# Patient Record
Sex: FEMALE | Race: WHITE | NOT HISPANIC OR LATINO | URBAN - METROPOLITAN AREA
[De-identification: names, ages, dates, MRNs, and addresses within clinical notes are randomized per-mention and may not be internally consistent; named-entity substitution may affect disease eponyms.]

---

## 2017-01-16 ENCOUNTER — OFFICE VISIT - RIVER FALLS (OUTPATIENT)
Dept: FAMILY MEDICINE | Facility: CLINIC | Age: 42
End: 2017-01-16

## 2017-01-25 ENCOUNTER — OFFICE VISIT - RIVER FALLS (OUTPATIENT)
Dept: FAMILY MEDICINE | Facility: CLINIC | Age: 42
End: 2017-01-25

## 2017-01-25 ASSESSMENT — MIFFLIN-ST. JEOR: SCORE: 1925.69

## 2017-04-21 ENCOUNTER — OFFICE VISIT - RIVER FALLS (OUTPATIENT)
Dept: FAMILY MEDICINE | Facility: CLINIC | Age: 42
End: 2017-04-21

## 2017-12-08 ENCOUNTER — AMBULATORY - RIVER FALLS (OUTPATIENT)
Dept: FAMILY MEDICINE | Facility: CLINIC | Age: 42
End: 2017-12-08

## 2017-12-09 LAB — HBA1C MFR BLD: 6.1 %

## 2017-12-13 ENCOUNTER — OFFICE VISIT - RIVER FALLS (OUTPATIENT)
Dept: FAMILY MEDICINE | Facility: CLINIC | Age: 42
End: 2017-12-13

## 2017-12-13 ASSESSMENT — MIFFLIN-ST. JEOR: SCORE: 1922.06

## 2018-04-03 ENCOUNTER — OFFICE VISIT - RIVER FALLS (OUTPATIENT)
Dept: FAMILY MEDICINE | Facility: CLINIC | Age: 43
End: 2018-04-03

## 2018-04-03 ASSESSMENT — MIFFLIN-ST. JEOR: SCORE: 1950.19

## 2018-04-04 LAB
CREAT SERPL-MCNC: 0.61 MG/DL (ref 0.5–1.1)
GLUCOSE BLD-MCNC: 69 MG/DL (ref 65–99)
HBA1C MFR BLD: 6.3 %

## 2018-08-03 ENCOUNTER — OFFICE VISIT - RIVER FALLS (OUTPATIENT)
Dept: FAMILY MEDICINE | Facility: CLINIC | Age: 43
End: 2018-08-03

## 2018-10-18 ENCOUNTER — OFFICE VISIT - RIVER FALLS (OUTPATIENT)
Dept: FAMILY MEDICINE | Facility: CLINIC | Age: 43
End: 2018-10-18

## 2018-10-19 LAB
CHOLEST SERPL-MCNC: 172 MG/DL
CHOLEST/HDLC SERPL: 4.2 {RATIO}
CREAT SERPL-MCNC: 0.67 MG/DL (ref 0.5–1.1)
GLUCOSE BLD-MCNC: 114 MG/DL (ref 65–99)
HBA1C MFR BLD: 6.2 %
HDLC SERPL-MCNC: 41 MG/DL
LDLC SERPL CALC-MCNC: 112 MG/DL
NONHDLC SERPL-MCNC: 131 MG/DL
TRIGL SERPL-MCNC: 91 MG/DL

## 2018-12-31 ENCOUNTER — OFFICE VISIT - RIVER FALLS (OUTPATIENT)
Dept: FAMILY MEDICINE | Facility: CLINIC | Age: 43
End: 2018-12-31

## 2019-05-28 ENCOUNTER — COMMUNICATION - RIVER FALLS (OUTPATIENT)
Dept: FAMILY MEDICINE | Facility: CLINIC | Age: 44
End: 2019-05-28

## 2019-05-29 ENCOUNTER — AMBULATORY - RIVER FALLS (OUTPATIENT)
Dept: FAMILY MEDICINE | Facility: CLINIC | Age: 44
End: 2019-05-29

## 2019-05-31 LAB
HBA1C MFR BLD: 7.1 %
TSH SERPL DL<=0.005 MIU/L-ACNC: 8.74 MIU/L

## 2019-06-03 ENCOUNTER — OFFICE VISIT - RIVER FALLS (OUTPATIENT)
Dept: FAMILY MEDICINE | Facility: CLINIC | Age: 44
End: 2019-06-03

## 2019-06-03 ENCOUNTER — COMMUNICATION - RIVER FALLS (OUTPATIENT)
Dept: FAMILY MEDICINE | Facility: CLINIC | Age: 44
End: 2019-06-03

## 2019-06-03 ASSESSMENT — MIFFLIN-ST. JEOR: SCORE: 1991.92

## 2019-06-24 ENCOUNTER — COMMUNICATION - RIVER FALLS (OUTPATIENT)
Dept: FAMILY MEDICINE | Facility: CLINIC | Age: 44
End: 2019-06-24

## 2019-07-05 ENCOUNTER — OFFICE VISIT - RIVER FALLS (OUTPATIENT)
Dept: FAMILY MEDICINE | Facility: CLINIC | Age: 44
End: 2019-07-05

## 2019-12-13 ENCOUNTER — AMBULATORY - RIVER FALLS (OUTPATIENT)
Dept: FAMILY MEDICINE | Facility: CLINIC | Age: 44
End: 2019-12-13

## 2019-12-14 LAB
HBA1C MFR BLD: 7.1 %
T4 FREE SERPL-MCNC: 1.4 NG/DL (ref 0.8–1.8)
TSH SERPL DL<=0.005 MIU/L-ACNC: 3.46 MIU/L

## 2019-12-16 ENCOUNTER — COMMUNICATION - RIVER FALLS (OUTPATIENT)
Dept: FAMILY MEDICINE | Facility: CLINIC | Age: 44
End: 2019-12-16

## 2020-01-06 ENCOUNTER — OFFICE VISIT - RIVER FALLS (OUTPATIENT)
Dept: FAMILY MEDICINE | Facility: CLINIC | Age: 45
End: 2020-01-06

## 2020-01-06 ASSESSMENT — MIFFLIN-ST. JEOR: SCORE: 1997.36

## 2020-10-01 ENCOUNTER — AMBULATORY - RIVER FALLS (OUTPATIENT)
Dept: FAMILY MEDICINE | Facility: CLINIC | Age: 45
End: 2020-10-01

## 2020-10-02 LAB
BASOPHILS # BLD MANUAL: 18 10*3/UL (ref 0–200)
BASOPHILS NFR BLD MANUAL: 0.2 %
BUN SERPL-MCNC: 12 MG/DL (ref 7–25)
BUN/CREAT RATIO - HISTORICAL: ABNORMAL (ref 6–22)
CALCIUM SERPL-MCNC: 9.5 MG/DL (ref 8.6–10.2)
CHLORIDE BLD-SCNC: 103 MMOL/L (ref 98–110)
CHOLEST SERPL-MCNC: 188 MG/DL
CHOLEST/HDLC SERPL: 3.8 {RATIO}
CO2 SERPL-SCNC: 28 MMOL/L (ref 20–32)
CREAT SERPL-MCNC: 0.55 MG/DL (ref 0.5–1.1)
EGFRCR SERPLBLD CKD-EPI 2021: 115 ML/MIN/1.73M2
EOSINOPHIL # BLD MANUAL: 359 10*3/UL (ref 15–500)
EOSINOPHIL NFR BLD MANUAL: 3.9 %
ERYTHROCYTE [DISTWIDTH] IN BLOOD BY AUTOMATED COUNT: 12.2 % (ref 11–15)
GLUCOSE BLD-MCNC: 170 MG/DL (ref 65–99)
HBA1C MFR BLD: 8 %
HCT VFR BLD AUTO: 45.1 % (ref 35–45)
HDLC SERPL-MCNC: 49 MG/DL
HGB BLD-MCNC: 15.3 GM/DL (ref 11.7–15.5)
LDLC SERPL CALC-MCNC: 119 MG/DL
LYMPHOCYTES # BLD MANUAL: 2355 10*3/UL (ref 850–3900)
LYMPHOCYTES NFR BLD MANUAL: 25.6 %
MCH RBC QN AUTO: 30 PG (ref 27–33)
MCHC RBC AUTO-ENTMCNC: 33.9 GM/DL (ref 32–36)
MCV RBC AUTO: 88.4 FL (ref 80–100)
MICROALBUMIN UR-MCNC: 1.6 MG/DL
MONOCYTES # BLD MANUAL: 506 10*3/UL (ref 200–950)
MONOCYTES NFR BLD MANUAL: 5.5 %
NEUTROPHILS # BLD MANUAL: 5962 10*3/UL (ref 1500–7800)
NEUTROPHILS NFR BLD MANUAL: 64.8 %
NONHDLC SERPL-MCNC: 139 MG/DL
PLATELET # BLD AUTO: 227 10*3/UL (ref 140–400)
PMV BLD: 11 FL (ref 7.5–12.5)
POTASSIUM BLD-SCNC: 4.3 MMOL/L (ref 3.5–5.3)
RBC # BLD AUTO: 5.1 10*6/UL (ref 3.8–5.1)
SODIUM SERPL-SCNC: 137 MMOL/L (ref 135–146)
TRIGL SERPL-MCNC: 92 MG/DL
TSH SERPL DL<=0.005 MIU/L-ACNC: 4.45 MIU/L
WBC # BLD AUTO: 9.2 10*3/UL (ref 3.8–10.8)

## 2020-10-04 ENCOUNTER — COMMUNICATION - RIVER FALLS (OUTPATIENT)
Dept: FAMILY MEDICINE | Facility: CLINIC | Age: 45
End: 2020-10-04

## 2020-10-12 ENCOUNTER — OFFICE VISIT - RIVER FALLS (OUTPATIENT)
Dept: FAMILY MEDICINE | Facility: CLINIC | Age: 45
End: 2020-10-12

## 2020-10-12 ASSESSMENT — MIFFLIN-ST. JEOR: SCORE: 1981.03

## 2021-01-04 ENCOUNTER — COMMUNICATION - RIVER FALLS (OUTPATIENT)
Dept: FAMILY MEDICINE | Facility: CLINIC | Age: 46
End: 2021-01-04

## 2021-01-04 ENCOUNTER — AMBULATORY - RIVER FALLS (OUTPATIENT)
Dept: FAMILY MEDICINE | Facility: CLINIC | Age: 46
End: 2021-01-04

## 2021-01-05 ENCOUNTER — COMMUNICATION - RIVER FALLS (OUTPATIENT)
Dept: FAMILY MEDICINE | Facility: CLINIC | Age: 46
End: 2021-01-05

## 2021-01-05 LAB — HBA1C MFR BLD: 7.4 %

## 2021-02-08 ENCOUNTER — OFFICE VISIT - RIVER FALLS (OUTPATIENT)
Dept: FAMILY MEDICINE | Facility: CLINIC | Age: 46
End: 2021-02-08

## 2021-02-08 ASSESSMENT — MIFFLIN-ST. JEOR: SCORE: 1969.24

## 2021-11-12 ENCOUNTER — AMBULATORY - RIVER FALLS (OUTPATIENT)
Dept: FAMILY MEDICINE | Facility: CLINIC | Age: 46
End: 2021-11-12

## 2021-11-13 LAB
BUN SERPL-MCNC: 14 MG/DL (ref 7–25)
BUN/CREAT RATIO - HISTORICAL: ABNORMAL (ref 6–22)
CALCIUM SERPL-MCNC: 9.5 MG/DL (ref 8.6–10.2)
CHLORIDE BLD-SCNC: 100 MMOL/L (ref 98–110)
CO2 SERPL-SCNC: 24 MMOL/L (ref 20–32)
CREAT SERPL-MCNC: 0.82 MG/DL (ref 0.5–1.1)
CREAT UR-MCNC: NORMAL MG/DL
EGFRCR SERPLBLD CKD-EPI 2021: 86 ML/MIN/1.73M2
GLUCOSE BLD-MCNC: 266 MG/DL (ref 65–99)
HBA1C MFR BLD: 11 %
MICROALBUMIN UR-MCNC: NORMAL MG/DL
POTASSIUM BLD-SCNC: 4.1 MMOL/L (ref 3.5–5.3)
SODIUM SERPL-SCNC: 135 MMOL/L (ref 135–146)
TSH SERPL DL<=0.005 MIU/L-ACNC: 8.37 MIU/L

## 2021-11-15 ENCOUNTER — COMMUNICATION - RIVER FALLS (OUTPATIENT)
Dept: FAMILY MEDICINE | Facility: CLINIC | Age: 46
End: 2021-11-15

## 2021-11-16 ENCOUNTER — COMMUNICATION - RIVER FALLS (OUTPATIENT)
Dept: FAMILY MEDICINE | Facility: CLINIC | Age: 46
End: 2021-11-16

## 2021-11-23 ENCOUNTER — OFFICE VISIT - RIVER FALLS (OUTPATIENT)
Dept: FAMILY MEDICINE | Facility: CLINIC | Age: 46
End: 2021-11-23

## 2021-11-24 ENCOUNTER — COMMUNICATION - RIVER FALLS (OUTPATIENT)
Dept: FAMILY MEDICINE | Facility: CLINIC | Age: 46
End: 2021-11-24

## 2021-11-29 ENCOUNTER — COMMUNICATION - RIVER FALLS (OUTPATIENT)
Dept: FAMILY MEDICINE | Facility: CLINIC | Age: 46
End: 2021-11-29

## 2022-01-31 ENCOUNTER — OFFICE VISIT - RIVER FALLS (OUTPATIENT)
Dept: FAMILY MEDICINE | Facility: CLINIC | Age: 47
End: 2022-01-31

## 2022-02-02 LAB
BUN SERPL-MCNC: 14 MG/DL (ref 7–25)
BUN/CREAT RATIO - HISTORICAL: ABNORMAL (ref 6–22)
CALCIUM SERPL-MCNC: 9.7 MG/DL (ref 8.6–10.2)
CHLORIDE BLD-SCNC: 103 MMOL/L (ref 98–110)
CO2 SERPL-SCNC: 23 MMOL/L (ref 20–32)
CREAT SERPL-MCNC: 0.72 MG/DL (ref 0.5–1.1)
EGFRCR SERPLBLD CKD-EPI 2021: 101 ML/MIN/1.73M2
GLUCOSE BLD-MCNC: 178 MG/DL (ref 65–99)
HBA1C MFR BLD: 10.2 %
POTASSIUM BLD-SCNC: 4 MMOL/L (ref 3.5–5.3)
SODIUM SERPL-SCNC: 138 MMOL/L (ref 135–146)
TSH SERPL DL<=0.005 MIU/L-ACNC: 9.83 MIU/L

## 2022-02-11 VITALS
DIASTOLIC BLOOD PRESSURE: 72 MMHG | SYSTOLIC BLOOD PRESSURE: 124 MMHG | WEIGHT: 271.8 LBS | HEIGHT: 70 IN | BODY MASS INDEX: 38.91 KG/M2 | HEART RATE: 74 BPM

## 2022-02-11 VITALS
SYSTOLIC BLOOD PRESSURE: 126 MMHG | HEIGHT: 70 IN | WEIGHT: 282.2 LBS | HEART RATE: 68 BPM | DIASTOLIC BLOOD PRESSURE: 68 MMHG | BODY MASS INDEX: 40.4 KG/M2

## 2022-02-11 VITALS
WEIGHT: 265.6 LBS | BODY MASS INDEX: 38.02 KG/M2 | SYSTOLIC BLOOD PRESSURE: 126 MMHG | DIASTOLIC BLOOD PRESSURE: 74 MMHG | HEART RATE: 64 BPM | HEIGHT: 70 IN

## 2022-02-11 VITALS
BODY MASS INDEX: 39.88 KG/M2 | WEIGHT: 278.6 LBS | HEIGHT: 70 IN | DIASTOLIC BLOOD PRESSURE: 72 MMHG | SYSTOLIC BLOOD PRESSURE: 138 MMHG | HEART RATE: 72 BPM

## 2022-02-11 VITALS
HEART RATE: 91 BPM | DIASTOLIC BLOOD PRESSURE: 82 MMHG | TEMPERATURE: 98.1 F | OXYGEN SATURATION: 96 % | HEIGHT: 70 IN | SYSTOLIC BLOOD PRESSURE: 134 MMHG

## 2022-02-11 VITALS
HEART RATE: 62 BPM | SYSTOLIC BLOOD PRESSURE: 136 MMHG | HEIGHT: 70 IN | BODY MASS INDEX: 39.51 KG/M2 | DIASTOLIC BLOOD PRESSURE: 68 MMHG | WEIGHT: 276 LBS

## 2022-02-11 VITALS
HEART RATE: 95 BPM | BODY MASS INDEX: 38.14 KG/M2 | DIASTOLIC BLOOD PRESSURE: 80 MMHG | TEMPERATURE: 98.8 F | WEIGHT: 266.4 LBS | HEIGHT: 70 IN | OXYGEN SATURATION: 93 % | SYSTOLIC BLOOD PRESSURE: 130 MMHG

## 2022-02-11 VITALS
WEIGHT: 281.6 LBS | DIASTOLIC BLOOD PRESSURE: 78 MMHG | SYSTOLIC BLOOD PRESSURE: 122 MMHG | WEIGHT: 281 LBS | HEART RATE: 64 BPM | DIASTOLIC BLOOD PRESSURE: 70 MMHG | BODY MASS INDEX: 40.23 KG/M2 | BODY MASS INDEX: 40.99 KG/M2 | HEIGHT: 70 IN | TEMPERATURE: 98.7 F | HEART RATE: 64 BPM | SYSTOLIC BLOOD PRESSURE: 142 MMHG

## 2022-02-11 VITALS
OXYGEN SATURATION: 97 % | SYSTOLIC BLOOD PRESSURE: 118 MMHG | HEART RATE: 89 BPM | HEIGHT: 70 IN | TEMPERATURE: 98.1 F | BODY MASS INDEX: 39.56 KG/M2 | DIASTOLIC BLOOD PRESSURE: 70 MMHG

## 2022-02-11 VITALS
WEIGHT: 292 LBS | DIASTOLIC BLOOD PRESSURE: 84 MMHG | OXYGEN SATURATION: 95 % | SYSTOLIC BLOOD PRESSURE: 138 MMHG | BODY MASS INDEX: 42.5 KG/M2 | TEMPERATURE: 98.3 F

## 2022-02-12 VITALS
HEART RATE: 85 BPM | SYSTOLIC BLOOD PRESSURE: 137 MMHG | WEIGHT: 273.6 LBS | DIASTOLIC BLOOD PRESSURE: 84 MMHG | TEMPERATURE: 98.2 F | OXYGEN SATURATION: 98 % | BODY MASS INDEX: 39.82 KG/M2

## 2022-02-16 NOTE — CARE COORDINATION
Pt appears on  [facility] chronic disease panel as out of parameters for DM and not being on a statin.     Due for exam now. RTC placed for her.

## 2022-02-16 NOTE — TELEPHONE ENCOUNTER
---------------------  From: GABRIELE FINK  To: [facility]  Sent: 01/04/2021 07:50 a.m. CST  Subject: Blood sugar  Dr. Ferguson    I have about 1 week of medformin left.  Blood sugar running 115 to 140.  Do I need to do a1c check or do you want to refill current prescription?    Gabriele Fink---------------------  From: Felecia Hubbard CMA (Phone Messages Pool ([id number]_[employer]))   To: ZIM Message Pool ([id number]_[place]);     Sent: 1/4/2021 9:50:13 AM CST  Subject: FW: Blood sugar---------------------  From: Bella Irby CMA (Ideapod Message Pool ([id number]_[employer]))   To: GABRIELE FINK    Sent: 1/4/2021 10:43:11 AM CST  Subject: RE: Blood sugar     William Hammond,  I sent in a refill of your metformin. You are do to check your A1C this month, I will have our scheduling dept. reach out to you to set up a time or the lab visit.  Have a nice dayJoselyn---------------------  From: Bella Irby CMA ([employer] Message Pool ([id number]_[employer]))   To: Appointment Pool ([id number]_[place]);     Sent: 1/4/2021 10:43:39 AM CST  Subject: FW: Blood sugar     Can you contact pt to set up non- fasting lab appt.Scheduled

## 2022-02-16 NOTE — TELEPHONE ENCOUNTER
---------------------  From: GABRIELE FINK  To: [facility]  Sent: 11/15/2021 08:47 p.m. CST  Subject: Follow up  I am scheduled for next Tuesday.

## 2022-02-16 NOTE — PROGRESS NOTES
Patient:   GABRIELE FINK            MRN: [id number]            FIN: [id number]               Age:   43 years     Sex:  Female     :  1975   Associated Diagnoses:   Sinus infection   Author:   Dameon Ferguson MD      Chief Complaint   2018 4:38 PM CST   Possible sinus infection-has had bilateral ear pain, fever, cough, facial pain and pressure, has had symptoms about one week      History of Present Illness   see chief complaint as noted above and confirmed with the patient     43 year old female presents with complaint of not feeling well. For the past week she has had bilateral ear pain, headache, coughing up yellow phlegm, facial pain and pressure, nasal congestion, and fevers. She works from 3am until 7pm and she works very hard, she took a week off and than she began to get sick. She says she feels rundown and tired, her ear pain felt as if she was getting stabbed in her ears. She works in a field and takes Allegra daily to help with allergies. She does feel today she has been able to breath through her nose today and that has not been happening for the past week.       Review of Systems   Constitutional:  Fever.    Ear/Nose/Mouth/Throat:  Nasal congestion, No sore throat.         Ear pain: Bilateral.    Respiratory:  Cough, No shortness of breath, No wheezing.    Cardiovascular:  No chest pain.    Gastrointestinal:  No nausea, No vomiting, No diarrhea.    Integumentary:  No rash.    Neurologic:  Alert and oriented X4, Headache.       Health Status   Allergies:    Allergic Reactions (Selected)  Severity Not Documented  Sulfa drugs (Hives, sob)   Medications:  (Selected)   Prescriptions  Prescribed  zonia contour lancets: zonia contour lancets, See Instructions, Instructions: testing 2x/ day, Supply, # 1 box(es), 3 Refill(s), Type: Maintenance, Pharmacy: [facility], testing 2x/ day  contour test strips: contour test strips, See Instructions, Instructions: test 2x/ day, Supply, # 200 EA, 3  "Refill(s), Type: Maintenance, Pharmacy: [facility], test 2x/ day  levothyroxine 100 mcg (0.1 mg) oral tablet: = 1 tab(s) ( 100 mcg ), po, daily, # 90 tab(s), 1 Refill(s), Type: Maintenance, Pharmacy: [facility], 1 tab(s) Oral daily  Documented Medications  Documented  Aleve 220 mg oral tablet: 2 tab(s) ( 440 mg ), PO, q8hr, PRN: for headache, # 60 tab(s), 0 Refill(s), Type: Maintenance  Allegra 24 Hour Allergy: ( 180 mg ), Oral, daily, 0 Refill(s), Type: Maintenance  Fish Oil: po, 0 Refill(s), Type: Maintenance  Iron Chews: ( 15 mg ), po, daily, 0 Refill(s), Type: Maintenance  Vitamin D with Minerals oral tablet: 1 tab(s), po, daily, 0 Refill(s), Type: Maintenance  multivitamin with fluoride: daily, 0 Refill(s), Type: Maintenance   Problem list:    All Problems  Type 2 diabetes mellitus / SNOMED CT 802080801 / Confirmed  Refusal of statin medication by patient / SNOMED CT 586523877 / Confirmed  Obese / ICD-9-.00 / Probable  Morbid Obesity / ICD-9-.01 / Probable  Hypothyroid / ICD-9-.9 / Confirmed  Female Hirsutism / ICD-9-.1 / Confirmed  Anemia, iron deficiency / ICD-9-.9 / Confirmed  Inactive: Tobacco user / ICD-9-.1  Inactive: Menarche / ICD-9-CM V21.8  Resolved: Concussion / ICD-9-.9      Histories   Past Medical History:    Active  Type 2 diabetes mellitus (625949158): Onset in the month of 9/2016 at 40 years  Resolved  Concussion (850.9): Onset on 9/20/2004 at 28 years.  Resolved.  Comments:  5/10/2011 CDT 2:02 PM CDT - Judy Sanz  significant fall with mild head concussion and fracture of L4   Family History:    Lymphoma  Brother  Hypertension  Mother  Diabetes mellitus type I  Father  Sister  Thyroid Ca  Mother  Brother  Cancer  Sister  Comments:  12/21/2011 4:40 PM EDDA - Elena CMA, April  \"muscle cancer\"  Obesity, morbid.  Sister     Procedure history:    Laparoscopic cholecystectomy (65208002) on 9/19/2013 at 37 Years.  Comments:  9/23/2013 10:41 " DEBORAH BARTONT - Christa BALDWIN, Alisha  Robotic assisted  History of shoulder surgery ([id number]) in 2006 at 31 Years.   Social History:        Alcohol Assessment: Current            Current, Wine (5 oz), 1-2 times per week      Tobacco Assessment: Past            Current                     Comments:                      05/10/2011 - Yvon Klarissaan                     quit      Substance Abuse Assessment            Never      Employment and Education Assessment            Employed, Work/School description: [employer].      Home and Environment Assessment            Marital status: .  Spouse/Partner name: Harsha.      Exercise and Physical Activity Assessment: Regular exercise            Exercise type: Walking.            Exercise frequency: 3-4 times/week.  Exercise type: Walking.      Sexual Assessment            Sexually active: Yes.  Sexual orientation: Heterosexual.      Physical Examination   Vital Signs   12/31/2018 4:38 PM CST Temperature Tympanic 98.1 DegF    Peripheral Pulse Rate 91 bpm    Pulse Site Radial artery    Systolic Blood Pressure 134 mmHg  HI    Diastolic Blood Pressure 82 mmHg  HI    Mean Arterial Pressure 99 mmHg    BP Site Right arm    Oxygen Saturation 96 %      Measurements from flowsheet : Measurements   12/31/2018 4:38 PM CST   Height Measured - Standard                69.5 in     General:  Alert and oriented, No acute distress.    Eye:  Pupils are equal, round and reactive to light, Normal conjunctiva.    HENT:  Oral mucosa is moist.    Neck:  Supple.    Respiratory:  Lungs are clear to auscultation, Respirations are non-labored.    Cardiovascular:  Normal rate, Regular rhythm, No edema.    Gastrointestinal:  Non-distended.    Musculoskeletal:  Normal gait.    Integumentary:  Warm, No rash.    Neurologic:  Alert, Oriented.    Psychiatric:  Cooperative, Appropriate mood & affect, Normal judgment.       Review / Management   Results review      Impression and Plan        Diagnosis     Sinus infection (NZC52-HL J32.9).     Plan:  Will treat with Amoxicillian for 10 days.       Sinusitis responds well to moist air and irrigation -- consider trying saltwater nasal sprays or a NETI pot twice daily.  Showers and hot soups can help also.  Afrin nasal spray (over-the-counter medication) can provide symptom relief as it clears the nose.  After 3 days of use it must be discontinued for at least 2 weeks or complications can happen.  Sudafed can relieve pressure but might make you feel jittery or your heart race.  You should begin to improve after several days but it may take a couple of weeks to completely resolve your symptoms.  Return if you are not improving or if your symptoms become worse. .    Summary:  Reviewed expected cause with patient    What to watch for     and when to return  .    Soni DEVI Medical Assistant acted solely as a scribe for, and in presence of Dr. Dameon Ferguson who performed the services.

## 2022-02-16 NOTE — PROGRESS NOTES
Chief Complaint    Med check  History of Present Illness       Patient here to discuss her medications her problems include       1.  Morbid obesity BMI 40       2 diabetes mellitus       She has changed her eating and it has resulted in her A1c improving from 8-7.4.  She mostly is avoiding late night snacking and decreased her carb intake.  She has been very active and does monitor her steps.  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       Hypothyroidism patient's TSH in October 2020 was 4.45  Review of Systems       See HPI.  All other review of systems negative.  Physical Exam   Vitals & Measurements    HR: 62 (Peripheral)  BP: 136/68     HT: 69.5 in  WT: 276 lb  BMI: 40.17        Alert and oriented White sclera       Supple neck no thyromegaly or adenopathy       Lungs are clear to auscultation       Heart regular no murmur       Feet have no callus good monofilament testing  Assessment/Plan       1. Hypothyroid (E03.9)          Continue on her present dose of levothyroxine 125 mcg                2. Morbid Obesity (E66.01)          She is started healthier eating she is shown in the past that she can stick to it and hopefully it will result in some weight loss                3. Refusal of statin medication by patient (Z53.29)          Patient's most recent LDL was 119 and her HDL was 49 we discussed statin use in diabetics she does not want to add it at this time                4. Type 2 diabetes mellitus (E11.9)          Patient's A1c is improved but I talked about how there is still improvement on the table.  Her 10-year cardiac risk is estimated at 2.1% so she does not meet criteria for aspirin use she has not had any hypoglycemia she will continue on her Metformin and will recheck in 6 months         Ordered:          Return to Clinic (Request), RFV: DM check, Return in 6 months                Orders:         metFORMIN, = 1 tab(s) (  500 mg ), Oral, bid, # 60 tab(s), 0 Refill(s), Type: Hard Stop, Pharmacy: [facility], 69.5, in, 10/12/20 17:18:00 CDT, Height Measured, 278.6, lb, 10/12/20 17:18:00 CDT, Weight Measured, (Completed)         metFORMIN, = 1 tab(s) ( 500 mg ), Oral, bid, # 180 tab(s), 3 Refill(s), Type: Maintenance, Pharmacy: [facility], 1 tab(s) Oral bid,x90 day(s), 69.5, in, 02/08/21 17:17:00 CST, Height Measured, 276, lb, 02/08/21 17:17:00 CST, Weight Measured, (Ordered)         Return to Clinic (Request), RFV: DM check with a1c, Return in 3 months         Return to Clinic (Request), RFV: DM check and fasting labs, Return in 6 months  Patient Information     Name:GABRIELE FINK      Address:      [address] [place] [id number]     Sex:Female     YOB: 1975     [phone]     Emergency Contact:JERMAINE FINK     MRN:[id number]     FIN:[id number]     Location:[facility]     Date of Service:02/08/2021      Primary Care Physician:       Dameon Ferguson MD, [phone]      Attending Physician:       Dameon Ferguson MD, [phone]  Problem List/Past Medical History    Ongoing     Anemia, iron deficiency     Female Hirsutism     Hypothyroid     Menarche     Morbid Obesity     Obese     Refusal of statin medication by patient     Tobacco user       Comments: pt quit 2009     Type 2 diabetes mellitus    Historical     Concussion       Comments: significant fall with mild head concussion and fracture of L4  Procedure/Surgical History     Laparoscopic cholecystectomy (09/19/2013)      Comments: Robotic assisted.     History of shoulder surgery (2006)  Medications    Aleve 220 mg oral tablet, 440 mg= 2 tab(s), Oral, q8 hrs, PRN    zonia contour lancets, See Instructions, 3 refills    contour test strips, See Instructions, 3 refills    Fish Oil, Oral    Iron Chews, 15 mg, Oral, daily    levothyroxine 125 mcg (0.125 mg) oral tablet, 125 mcg= 1 tab(s), Oral, daily, 3  refills    metFORMIN 500 mg oral tablet, 500 mg= 1 tab(s), Oral, bid, 3 refills    multivitamin with fluoride, daily    Vitamin D with Minerals oral tablet, 1 tab(s), Oral, daily  Allergies    sulfa drugs (hives, SOB)  Social History    Smoking Status     Never smoker     Alcohol - Current      Current, Wine (5 oz), 1-2 times per week     Electronic Cigarette/Vaping      Electronic Cigarette Use: Never.     Employment/School      Employed, Work/School description: [employer].     Exercise - Regular exercise      Exercise frequency: 3-4 times/week. Exercise type: Walking.     Home/Environment      Marital status: . Spouse/Partner name: Harsha.     Sexual      Sexually active: Yes. Sexual orientation: Heterosexual.     Substance Abuse      Never     Tobacco - Past      Former smoker, quit more than 30 days ago  Family History    Cancer: Sister.    Diabetes mellitus type I: Father and Sister.    Hypertension: Mother.    Lymphoma: Brother.    Obesity, morbid.: Sister.    Thyroid Ca: Mother and Brother.    Sister: History is negative  Immunizations      Vaccine Date Status          tetanus/diphth/pertuss (Tdap) adult/adol 12/13/2017 Given          tetanus/diphth/pertuss (Tdap) adult/adol 11/21/2007 Recorded  Lab Results          Lab Results (Last 4 results within 90 days)           Hgb A1c: 7.4 High (01/04/21 13:28:00)

## 2022-02-16 NOTE — PROGRESS NOTES
Patient:   GABRIELE FINK            MRN: [id number]            FIN: [id number]               Age:   41 years     Sex:  Female     :  1975   Associated Diagnoses:   Hypothyroid; Anemia, iron deficiency; Morbid Obesity; Pain in left knee; Type 2 diabetes mellitus   Author:   Dameon Ferguson MD      Visit Information      Date of Service: 2017 05:55 pm  Performing Location: [facility] [place]  Encounter#: [id number]      Chief Complaint   2017 6:02 PM CST    pt here for diabetic check, has not been taking metfromin, also left knee pain has had to have chiro put it back in place 3 times this last month      History of Present Illness    The patient is a 41-year-old female here following up diabetes.    She has been putting significant effort into improving her lifestyle.  She has hired a  who monitors her weight, sugars, and diet electronically and giving her nutritional helps through [facility].  With that she says she feels much better, has lost weight, says her snoring has stopped, and she has good energy through the day.  Her most recent hemoglobin A1c was down to 6.8.     Her main concern has been her left knee.  She says it gives out on her.  It seems to click and then it will not work and she has actually fallen.  She says it feels like her knee dislocates.  She would like to be aggressive and see Orthopedics for this.  She is not having swelling, fever or chills.  No trauma.           Review of Systems   Constitutional:  No fever, No chills.    Eye   Ear/Nose/Mouth/Throat:  No nasal congestion, No sore throat.    Respiratory:  No shortness of breath, No cough.    Cardiovascular   Breast   Gastrointestinal:  No nausea, No vomiting, No diarrhea, No constipation.    Genitourinary:  No dysuria.    Gynecologic   Hematology/Lymphatics:  No bruising tendency, No swollen lymph glands.    Endocrine   Immunologic:  No recurrent fevers, No recurrent infections.   "  Musculoskeletal:  Joint pain (left knee), No muscle pain.    Integumentary:  No rash.    Neurologic:  No tingling, No headache.    Psychiatric            Health Status   Allergies:    Allergic Reactions (Selected)  Severity Not Documented  Sulfa drugs (Hives, sob)   Medications:  (Selected)   Prescriptions  Prescribed  zonia contour lancets: zonia contour lancets, See Instructions, Instructions: testing 2x/ day, Supply, # 1 box(es), 3 Refill(s), Type: Maintenance, Pharmacy: [facility], testing 2x/ day  contour test strips: contour test strips, See Instructions, Instructions: test 2x/ day, Supply, # 200 EA, 3 Refill(s), Type: Maintenance, Pharmacy: [facility], test 2x/ day  levothyroxine 100 mcg (0.1 mg) oral tablet: 1 tab(s) ( 100 mcg ), po, daily, # 90 tab(s), 1 Refill(s), Type: Maintenance, Pharmacy: [facility], 1 tab(s) po daily  Documented Medications  Documented  Iron Chews: ( 15 mg ), po, daily, 0 Refill(s), Type: Maintenance  Vitamin D with Minerals oral tablet: 1 tab(s), po, daily, 0 Refill(s), Type: Maintenance  multivitamin with fluoride: daily, 0 Refill(s), Type: Maintenance   Problem list:    All Problems (Selected)  Obese / 278.00 / Probable  Female Hirsutism / 704.1 / Confirmed  Anemia, iron deficiency / 280.9 / Confirmed  Hypothyroid / 244.9 / Confirmed  Morbid Obesity / 278.01 / Probable  Type 2 diabetes mellitus / 151913135 / Confirmed      Histories   Past Medical History:    Active  Type 2 diabetes mellitus (923981593): Onset in the month of 9/2016 at 40 years  Resolved  Concussion (850.9): Onset on 9/20/2004 at 28 years.  Resolved.  Comments:  5/10/2011 CDT 2:02 PM CDT - Judy Sanz  significant fall with mild head concussion and fracture of L4   Family History:    Lymphoma  Brother  Hypertension  Mother  Diabetes mellitus type I  Father  Sister  Thyroid Ca  Mother  Brother  Cancer  Sister  Comments:  12/21/2011 4:40 PM - Kassy EUCEDA, April  \"muscle cancer\"  Obesity, " morbid.  Sister     Procedure history:    Laparoscopic cholecystectomy (SNOMED CT 22430483) performed by Sagar Thomas MD on 9/19/2013 at 37 Years.  Comments:  9/23/2013 10:41 AM - Alisha Goodwin RN  Robotic assisted  History of shoulder surgery (SNOMED CT 1357771781) in 2006 at 31 Years.   Social History:        Alcohol Assessment: Current            Current, Wine (5 oz), 1-2 times per week      Tobacco Assessment: Past            Current                     Comments:                      05/10/2011 - Judy Sanz      Substance Abuse Assessment            Never      Employment and Education Assessment            Employed, Work/School description: [employer].      Home and Environment Assessment            Marital status: .  Spouse/Partner name: Harsha.      Exercise and Physical Activity Assessment: Regular exercise            Exercise type: Walking.            Exercise frequency: 3-4 times/week.  Exercise type: Walking.      Sexual Assessment            Sexually active: Yes.  Sexual orientation: Heterosexual.        Physical Examination   Vital Signs   1/25/2017 6:02 PM CST Temperature Tympanic 98.8 DegF    Peripheral Pulse Rate 95 bpm    Pulse Site Radial artery    Systolic Blood Pressure 132 mmHg    Diastolic Blood Pressure 84 mmHg    Mean Arterial Pressure 100 mmHg    BP Site Right arm    BP Systolic Repeat 130 mmHg    BP Diastolic Repeat 80 mmHg    BP Site Repeat Right arm    Oxygen Saturation 93 %  LOW      Measurements from flowsheet : Measurements   1/25/2017 6:02 PM CST Height Measured - Standard 69.5 in    Weight Measured - Standard 266.4 lb    BSA 2.43 m2    Body Mass Index 38.77 kg/m2      General:  Alert and oriented, No acute distress.    Eye:  Pupils are equal, round and reactive to light, Normal conjunctiva.    HENT:  Oral mucosa is moist.    Neck:  Supple.    Respiratory:  Lungs are clear to auscultation, Respirations are non-labored.     Cardiovascular:  Normal rate, Regular rhythm, No edema.    Gastrointestinal:  Non-distended.    Musculoskeletal:  Normal strength, No deformity, Normal gait.    Integumentary:  Warm, No rash.    Psychiatric:  Cooperative, Appropriate mood & affect, Normal judgment.       Review / Management   Results review:  Lab results   1/16/2017 7:55 AM CST Sodium Level 140 mmol/L    Potassium Level 4.4 mmol/L    Chloride Level 104 mmol/L    CO2 Level 27 mmol/L    Glucose Level 144 mg/dL  HI    BUN 16 mg/dL    Creatinine 0.73 mg/dL    BUN/Creat Ratio NOT APPLICABLE    eGFR 103 mL/min/1.73m2    eGFR African American 119 mL/min/1.73m2    Calcium Level 9.3 mg/dL    Hgb A1c 6.8  HI    U Microalbumin 0.9 mg/dL    Ur Creatinine 112 mg/dL    Ur Microalb/Creat Ratio 8   .       Impression and Plan   Diagnosis     Hypothyroid (HJL13-YA E03.9).     Anemia, iron deficiency (CJE32-MV D50.9).     Hypothyroid (PBF59-UU E03.9).     Morbid Obesity (DJZ52-ZV E66.01).     Pain in left knee (NOA05-MJ M25.562).     Type 2 diabetes mellitus (FQF76-CY E11.9).     Plan:  Because of the pain of the left knee and her desire to see Orthopedics I have put a referral in.     I cautioned her with her new found exercise to be especially careful on uneven ground or running and that biking may be easier on her knee.  We talked about the difference between short term changes versus long term health and she seems to have the right expectations and is making good strides.     We will be rechecking A1c along with TSH and LDL in three to four months.

## 2022-02-16 NOTE — TELEPHONE ENCOUNTER
---------------------  From: Lisa Hampton CMA   Sent: 12/3/2019 9:40:53 AM CST  Subject: Med Management     ** Submitted: **  Order:levothyroxine (levothyroxine 125 mcg (0.125 mg) oral tablet)  1 tab(s)  Oral  daily  Qty:  30 tab(s)        Duration:  30 day(s)        Refills:  0          Substitutions Allowed     Route To Pharmacy - [facility]    Signed by Lisa Hampton CMA  12/3/2019 9:40:00 AM    Phone Message    PCP:   Dania      Time of Call:  0839       Person Calling:  self  Phone number:  [phone]    Returned call at: 0938    Note:   pt calling about getting a refill of her levothyroxine to [facility]  LM on identified VM that she is due for labs and visit.  Told her I will refill x 1month and ask her to set up appt with for labs and Zim prior to next refill.  Asked to c/b with any questions.    Last office visit and reason:  6/3 hypothyroidism

## 2022-02-16 NOTE — PROGRESS NOTES
Chief Complaint    DM check  c/o knee pain and it has looked bruised since July  History of Present Illness      44-year-old here for refill of her thyroid medicine       Patient remains very physically active she has had a left knee replacement.  Her right knee does hurt her and is gotten worse over the last 6 months.  Her right knee has not locked or given way.       Patient had become better and get down to 250 pounds with regular exercise unfortunately she gained 30 pounds back is been stable at 280 now.  She has started exercising again and her knees are tolerating it.  Her blood sugars have been 100 to 1:30 in the morning and during the day it seems to see okay most recent A1c is over 7.1 consistent with average glucose of around 170  Review of Systems      No rashes, no chest pain, no shortness of breath, no headaches, no visual changes       No paresthesias, no bowel or bladder dysfunction, no falls  Physical Exam   Vitals & Measurements    HR: 68(Peripheral)  BP: 126/68     HT: 69.5 in  WT: 282.2 lb  BMI: 41.07       General: Alert and oriented, No acute distress.      Eye: Pupils are equal, round and reactive to light, Normal conjunctiva.      HENT: Oral mucosa is moist.      Neck: Supple.      Respiratory: Respirations are non-labored.  Clear to auscultation      Cardiovascular: Normal rate, Regular rhythm, No edema.      Gastrointestinal: Non-distended.      Musculoskeletal: Normal gait.      Integumentary: Warm, No rash.      Psychiatric: Cooperative, Appropriate mood & affect, Normal judgment  Assessment/Plan       1. Morbid Obesity (E66.01)         Patient goal is to lose 30 pounds over the next 6 months she has done it before through careful eating and changing some of her eating patterns she also works for 2 going to the gym and being more active        I reviewed x-ray of the knee which showed some mild DJD on the right knee she thinks she is going to see her orthopedic surgeon to see if they will  do cortisone and she is aware how weight loss would help her knee pain         Ordered:          30303 office outpatient visit 25 minutes (Charge), Quantity: 1, Right knee pain  Type 2 diabetes mellitus  Morbid Obesity  Hypothyroid                2. Type 2 diabetes mellitus (E11.9)         I recommended she start metformin she had a prescription once but never took it she declined saying she thinks she will lose weight go to the gym and she can have an A1c rechecked 6 months         Ordered:          49528 office outpatient visit 25 minutes (Charge), Quantity: 1, Right knee pain  Type 2 diabetes mellitus  Morbid Obesity  Hypothyroid                3. Hypothyroid (E03.9)         I refilled her medication she will be rechecked in 1 year         Ordered:          [id number] office outpatient visit 25 minutes (Charge), Quantity: 1, Right knee pain  Type 2 diabetes mellitus  Morbid Obesity  Hypothyroid                Orders:         levothyroxine, = 1 tab(s) ( 125 mcg ), Oral, daily, # 90 tab(s), 3 Refill(s), Type: Maintenance, Pharmacy: [facility], Due for labs and visit, 1 tab(s) Oral daily,x90 day(s), (Ordered)         levothyroxine, = 1 tab(s) ( 125 mcg ), Oral, daily, x 30 day(s), # 30 tab(s), 0 Refill(s), Type: Hard Stop, Pharmacy: [facility], Due for labs and visit, (Completed)         XR Knee Min 3 Views Right (Request), Right knee pain  Patient Information     Name:GABRIELE FINK      Address:      [address] [place] [id number]     Sex:Female     YOB: 1975     [phone]     Emergency Contact:JERMAINE FINK     MRN:[id number]     FIN:[id number]     Location:[facility]     Date of Service:01/06/2020      Primary Care Physician:       Dameon Ferguson MD, [phone]      Attending Physician:       Dameon Ferguson MD, [phone]  Problem List/Past Medical History    Ongoing     Anemia, iron deficiency     Female Hirsutism      Hypothyroid     Menarche     Morbid Obesity     Obese     Refusal of statin medication by patient     Tobacco user       Comments: pt quit 2009     Type 2 diabetes mellitus    Historical     Concussion       Comments: significant fall with mild head concussion and fracture of L4  Procedure/Surgical History     Laparoscopic cholecystectomy (09/19/2013)      Comments: Robotic assisted.     History of shoulder surgery (2006)  Medications    Aleve 220 mg oral tablet, 440 mg= 2 tab(s), Oral, q8 hrs, PRN    zonia contour lancets, See Instructions, 3 refills    contour test strips, See Instructions, 3 refills    Fish Oil, Oral    Iron Chews, 15 mg, Oral, daily    levothyroxine 125 mcg (0.125 mg) oral tablet, 125 mcg= 1 tab(s), Oral, daily, 3 refills    multivitamin with fluoride, daily    Vitamin D with Minerals oral tablet, 1 tab(s), Oral, daily  Allergies    sulfa drugs (hives, SOB)  Social History    Smoking Status - 01/06/2020     Former smoker     Alcohol - Current, 05/10/2011      Current, Wine (5 oz), 1-2 times per week, 05/10/2011     Employment/School      Employed, Work/School description: [employer] 12/21/2011     Exercise - Regular exercise, 05/10/2011      Exercise frequency: 3-4 times/week. Exercise type: Walking., 12/21/2011      Exercise type: Walking., 05/10/2011     Home/Environment      Marital status: . Spouse/Partner name: Harsha., 12/21/2011     Sexual      Sexually active: Yes. Sexual orientation: Heterosexual., 12/21/2011     Substance Abuse      Never, 12/21/2011     Tobacco - Past, 05/10/2011      Former smoker, quit more than 30 days ago, 07/05/2019      Current, 05/10/2011  Family History    Cancer: Sister.    Diabetes mellitus type I: Father and Sister.    Hypertension: Mother.    Lymphoma: Brother.    Obesity, morbid.: Sister.    Thyroid Ca: Mother and Brother.    Sister: History is negative  Immunizations      Vaccine Date Status          tetanus/diphth/pertuss  (Tdap) adult/adol 12/13/2017 Given          tetanus/diphth/pertuss (Tdap) adult/adol 11/21/2007 Recorded  Lab Results          Lab Results (Last 4 results within 90 days)           Hgb A1c: 7.1 High (12/13/19 08:27:00)          T4 Free: 1.4 ng/dL [0.8 ng/dL - 1.8 ng/dL] (12/13/19 08:27:00)          TSH: 3.46 mIU/L (12/13/19 08:27:00)

## 2022-02-16 NOTE — LETTER
(Inserted Image. Unable to display)   September 04, 2019        GABRIELE FINK  [address] [place], [place] [id number]        Dear GABRIELE,      Thank you for selecting [facility] (previously [facility]) for your healthcare needs.    Our records indicate you are due for the following services:     Non-Fasting Labs    If you had your labs done at another facility or with Direct Access Lab Testing at [facility], please bring in a copy of the results to your next visit, mail a copy, or drop off a copy of your results to your Healthcare Provider.    To schedule an appointment or if you have further questions, please contact your primary clinic:   [facility]       [phone]   [facility]       [phone]              [facility]     [phone]      Powered by [employer]    Sincerely,    Dameon Ferguson MD

## 2022-02-16 NOTE — LETTER
(Inserted Image. Unable to display)     May 06, 2019      GABRIELE FINK  [address] [place], [place] [id number]          Dear GABRIELE,      Thank you for selecting [facility] (previously [facility]) for your healthcare needs.    Our records indicate you are due for the following services:    Diabetic Exam ~ Please bring your glucose meter and/or your blood glucose diary to your appointment.    Non-Fasting Labs.    If you had your labs done at another facility or with Direct Access Lab Testing at [facility], please bring in a copy of the results to your next visit, mail a copy, or drop off a copy of your results to your Healthcare Provider.    You are due for lab work and an office visit; please schedule the lab appointment 1 week before the office visit.  This will assure all results are available to discuss with your provider during your visit.    **It is very helpful if you bring your medication bottles to your appointment.  This assures we have all of your current medications, including strength and dosing information, documented accurately in your medical record.    To schedule an appointment or if you have further questions, please contact your primary clinic:   [facility]       [phone]   [facility]       [phone]              [facility]     [phone]      Powered by [employer] and [employer]    Sincerely,    Dameon Ferguson MD

## 2022-02-16 NOTE — NURSING NOTE
Comprehensive Intake Entered On:  6/3/2019 6:18 PM CDT    Performed On:  6/3/2019 6:15 PM CDT by Bella Irby CMA               Summary   Chief Complaint :   f/u Labs    Advance Directive :   No   Menstrual Status :   Menarcheal   Weight Measured :   281.0 lb(Converted to: 281 lb 0 oz, 127.46 kg)    Height Measured :   69.5 in(Converted to: 5 ft 9 in, 176.53 cm)    Body Mass Index :   40.9 kg/m2 (HI)    Body Surface Area :   2.5 m2   Systolic Blood Pressure :   122 mmHg   Diastolic Blood Pressure :   70 mmHg   Mean Arterial Pressure :   87 mmHg   Peripheral Pulse Rate :   64 bpm   Bella Irby CMA - 6/3/2019 6:15 PM CDT   Health Status   Allergies Verified? :   Yes   Medication History Verified? :   Yes   Immunizations Current :   Yes   Pre-Visit Planning Status :   Completed   Tobacco Use? :   Never smoker   Bella Irby CMA - 6/3/2019 6:15 PM CDT   Consents   Consent for Immunization Exchange :   Consent Granted   Consent for Immunizations to Providers :   Consent Granted   Bella Irby CMA - 6/3/2019 6:15 PM CDT   Meds / Allergies   (As Of: 6/3/2019 6:18:44 PM CDT)   Allergies (Active)   sulfa drugs  Estimated Onset Date:   Unspecified ; Reactions:   hives, SOB ; Created By:   April Elena CMA; Reaction Status:   Active ; Category:   Drug ; Substance:   sulfa drugs ; Type:   Allergy ; Updated By:   April Elena CMA; Reviewed Date:   8/3/2018 1:57 PM CDT        Medication List   (As Of: 6/3/2019 6:18:44 PM CDT)   Prescription/Discharge Order    amoxicillin  :   amoxicillin ; Status:   Prescribed ; Ordered As Mnemonic:   amoxicillin 875 mg oral tablet ; Simple Display Line:   875 mg, 1 tab(s), PO, BID, for 10 day(s), 20 tab(s), 0 Refill(s) ; Ordering Provider:   Dameon Ferguson MD; Catalog Code:   amoxicillin ; Order Dt/Tm:   12/31/2018 4:49:37 PM          levothyroxine  :   levothyroxine ; Status:   Prescribed ; Ordered As Mnemonic:   levothyroxine 100 mcg (0.1 mg) oral  tablet ; Simple Display Line:   100 mcg, 1 tab(s), po, daily, 90 tab(s), 1 Refill(s) ; Ordering Provider:   Dameon Ferguson MD; Catalog Code:   levothyroxine ; Order Dt/Tm:   11/14/2018 8:09:23 AM          Miscellaneous Rx Supply  :   Miscellaneous Rx Supply ; Status:   Prescribed ; Ordered As Mnemonic:   zonia contour lancets ; Simple Display Line:   See Instructions, testing 2x/ day, 1 box(es), 3 Refill(s) ; Ordering Provider:   Dameon Ferguson MD; Catalog Code:   Miscellaneous Rx Supply ; Order Dt/Tm:   11/4/2016 3:58:24 PM          Miscellaneous Rx Supply  :   Miscellaneous Rx Supply ; Status:   Prescribed ; Ordered As Mnemonic:   contour test strips ; Simple Display Line:   See Instructions, test 2x/ day, 200 EA, 3 Refill(s) ; Ordering Provider:   Dameon Ferguson MD; Catalog Code:   Miscellaneous Rx Supply ; Order Dt/Tm:   11/4/2016 3:56:17 PM            Home Meds    carbonyl iron  :   carbonyl iron ; Status:   Documented ; Ordered As Mnemonic:   Iron Chews ; Simple Display Line:   15 mg, po, daily, 0 Refill(s) ; Catalog Code:   carbonyl iron ; Order Dt/Tm:   1/25/2017 6:02:57 PM          fexofenadine  :   fexofenadine ; Status:   Processing ; Ordered As Mnemonic:   Allegra 24 Hour Allergy ; Action Display:   Complete ; Catalog Code:   fexofenadine ; Order Dt/Tm:   6/3/2019 6:17:18 PM          multivitamin with fluoride  :   multivitamin with fluoride ; Status:   Documented ; Ordered As Mnemonic:   multivitamin with fluoride ; Simple Display Line:   daily, 0 Refill(s) ; Catalog Code:   multivitamin with fluoride ; Order Dt/Tm:   1/25/2017 6:03:04 PM          multivitamin with minerals  :   multivitamin with minerals ; Status:   Documented ; Ordered As Mnemonic:   Vitamin D with Minerals oral tablet ; Simple Display Line:   1 tab(s), po, daily, 0 Refill(s) ; Catalog Code:   multivitamin with minerals ; Order Dt/Tm:   1/25/2017 6:03:11 PM          naproxen  :   naproxen ; Status:   Documented ; Ordered As  Mnemonic:   Aleve 220 mg oral tablet ; Simple Display Line:   440 mg, 2 tab(s), PO, q8hr, PRN: for headache, 60 tab(s), 0 Refill(s) ; Catalog Code:   naproxen ; Order Dt/Tm:   8/3/2018 2:00:00 PM          omega-3 polyunsaturated fatty acids  :   omega-3 polyunsaturated fatty acids ; Status:   Documented ; Ordered As Mnemonic:   Fish Oil ; Simple Display Line:   po, 0 Refill(s) ; Catalog Code:   omega-3 polyunsaturated fatty acids ; Order Dt/Tm:   4/3/2018 12:56:11 PM

## 2022-02-16 NOTE — TELEPHONE ENCOUNTER
"---------------------  From: Ashley Faulkner RN ([facility] Messages Pool ([address] [place] [address]))   To: [facility] Message Pool ([address] [place] [address]);     Sent: 11/29/2021 9:00:21 AM CST  Subject: Consumer message: FW: Question     This a follow up message, in regards to Glucotrol XL 5mg BID per ZIM.      ---------------------  From: GABRIELE FINK  To: [facility]  Sent: 11/28/2021 08:07 p.m. CST  Subject: Question  Is this a prescription?---------------------  From: Les Arroyo LPN ([facility] Sunible Pool ([facility]))   To: Dameon Ferguson MD;     Sent: 11/29/2021 10:41:27 AM CST  Subject: FW: Consumer message: FW: Question  ** Submitted: **  Order:glipiZIDE (Glucotrol XL 5 mg oral tablet, extended release)  1 tab(s)  Oral  daily  Qty:  90 tab(s)        Refills:  0          Substitutions Allowed     Route To Pharmacy - [facility]    Signed by Dameon Ferguson MD  11/29/2021 9:51:00 PM [facility]---------------------  From: Dameon Ferguson MD   To: [facility] Message Pool ([id number]);     Sent: 11/29/2021 3:52:03 PM CST  Subject: RE: Consumer message: FW: Question     it is a prescription and I sent it in for you to start in the mornings---------------------  From: Les Arroyo LPN ([facility] Message Pool ([id number]))   To: GABRIELE FINK    Sent: 11/30/2021 9:06:27 AM CST  Subject: FW: Consumer message: FW: Question     Opal Hammond,    The following message is from your provider:    \"it is a prescription and I sent it in for you to start in the mornings\"  "

## 2022-02-16 NOTE — TELEPHONE ENCOUNTER
---------------------  From: Dameon Ferguson MD   To: GABRIELE FINK    Sent: 1/5/2021 7:43:17 AM CST  Subject: General Message       Your A1c improved.  Less than 8.0 is OK.   less than 7.0 is terrific    Results:  Date Result Name Ind Value Ref Range   1/4/2021 1:28 PM Hgb A1c ((H)) 7.4 ( - <5.7)

## 2022-02-16 NOTE — NURSING NOTE
Comprehensive Intake Entered On:  1/6/2020 6:01 PM CST    Performed On:  1/6/2020 5:57 PM CST by Bella Irby CMA               Summary   Chief Complaint :   DM check  c/o knee pain and it has looked bruised since July   Advance Directive :   No   Menstrual Status :   Menarcheal   Weight Measured :   282.2 lb(Converted to: 282 lb 3 oz, 128.00 kg)    Height Measured :   69.5 in(Converted to: 5 ft 9 in, 176.53 cm)    Body Mass Index :   41.07 kg/m2 (HI)    Body Surface Area :   2.5 m2   Systolic Blood Pressure :   126 mmHg   Diastolic Blood Pressure :   68 mmHg   Mean Arterial Pressure :   87 mmHg   Peripheral Pulse Rate :   68 bpm   Bella Irby CMA - 1/6/2020 5:57 PM CST   Health Status   Allergies Verified? :   Yes   Medication History Verified? :   Yes   Immunizations Current :   Yes   Pre-Visit Planning Status :   Completed   Tobacco Use? :   Former smoker   Bella Irby CMA - 1/6/2020 5:57 PM CST   Consents   Consent for Immunization Exchange :   Consent Granted   Consent for Immunizations to Providers :   Consent Granted   Bella Irby CMA - 1/6/2020 5:57 PM CST   Meds / Allergies   (As Of: 1/6/2020 6:01:00 PM CST)   Allergies (Active)   sulfa drugs  Estimated Onset Date:   Unspecified ; Reactions:   hives, SOB ; Created By:   April Elena CMA; Reaction Status:   Active ; Category:   Drug ; Substance:   sulfa drugs ; Type:   Allergy ; Updated By:   April Elena CMA; Reviewed Date:   7/5/2019 3:14 PM CDT        Medication List   (As Of: 1/6/2020 6:01:00 PM CST)   Prescription/Discharge Order    levothyroxine  :   levothyroxine ; Status:   Prescribed ; Ordered As Mnemonic:   levothyroxine 125 mcg (0.125 mg) oral tablet ; Simple Display Line:   125 mcg, 1 tab(s), Oral, daily, for 30 day(s), 30 tab(s), 0 Refill(s) ; Ordering Provider:   Dameon Ferguson MD; Catalog Code:   levothyroxine ; Order Dt/Tm:   12/3/2019 9:40:26 AM CST          Miscellaneous Rx Supply  :    Miscellaneous Rx Supply ; Status:   Prescribed ; Ordered As Mnemonic:   zonia contour lancets ; Simple Display Line:   See Instructions, testing 2x/ day, 1 box(es), 3 Refill(s) ; Ordering Provider:   Dameon Ferguson MD; Catalog Code:   Miscellaneous Rx Supply ; Order Dt/Tm:   11/4/2016 3:58:24 PM CDT          Miscellaneous Rx Supply  :   Miscellaneous Rx Supply ; Status:   Prescribed ; Ordered As Mnemonic:   contour test strips ; Simple Display Line:   See Instructions, test 2x/ day, 200 EA, 3 Refill(s) ; Ordering Provider:   Dameon Ferguson MD; Catalog Code:   Miscellaneous Rx Supply ; Order Dt/Tm:   11/4/2016 3:56:17 PM CDT            Home Meds    carbonyl iron  :   carbonyl iron ; Status:   Documented ; Ordered As Mnemonic:   Iron Chews ; Simple Display Line:   15 mg, po, daily, 0 Refill(s) ; Catalog Code:   carbonyl iron ; Order Dt/Tm:   1/25/2017 6:02:57 PM CST          multivitamin with fluoride  :   multivitamin with fluoride ; Status:   Documented ; Ordered As Mnemonic:   multivitamin with fluoride ; Simple Display Line:   daily, 0 Refill(s) ; Catalog Code:   multivitamin with fluoride ; Order Dt/Tm:   1/25/2017 6:03:04 PM CST          multivitamin with minerals  :   multivitamin with minerals ; Status:   Documented ; Ordered As Mnemonic:   Vitamin D with Minerals oral tablet ; Simple Display Line:   1 tab(s), po, daily, 0 Refill(s) ; Catalog Code:   multivitamin with minerals ; Order Dt/Tm:   1/25/2017 6:03:11 PM CST          naproxen  :   naproxen ; Status:   Documented ; Ordered As Mnemonic:   Aleve 220 mg oral tablet ; Simple Display Line:   440 mg, 2 tab(s), PO, q8hr, PRN: for headache, 60 tab(s), 0 Refill(s) ; Catalog Code:   naproxen ; Order Dt/Tm:   8/3/2018 2:00:00 PM CDT          omega-3 polyunsaturated fatty acids  :   omega-3 polyunsaturated fatty acids ; Status:   Documented ; Ordered As Mnemonic:   Fish Oil ; Simple Display Line:   po, 0 Refill(s) ; Catalog Code:   omega-3 polyunsaturated  fatty acids ; Order Dt/Tm:   4/3/2018 12:56:11 PM CDT

## 2022-02-16 NOTE — NURSING NOTE
Comprehensive Intake Entered On:  2/8/2021 5:21 PM CST    Performed On:  2/8/2021 5:17 PM CST by Bella Irby CMA               Summary   Chief Complaint :   Med check   Advance Directive :   No   Menstrual Status :   Menarcheal   Weight Measured :   276 lb(Converted to: 276 lb 0 oz, 125.191 kg)    Height Measured :   69.5 in(Converted to: 5 ft 9 in, 176.53 cm)    Body Mass Index :   40.17 kg/m2 (HI)    Body Surface Area :   2.48 m2   Systolic Blood Pressure :   136 mmHg (HI)    Diastolic Blood Pressure :   68 mmHg   Mean Arterial Pressure :   91 mmHg   Peripheral Pulse Rate :   62 bpm   Bella Irby CMA - 2/8/2021 5:17 PM CST   Health Status   Allergies Verified? :   Yes   Medication History Verified? :   Yes   Immunizations Current :   Yes   Pre-Visit Planning Status :   Completed   Tobacco Use? :   Never smoker   Bella Irby CMA - 2/8/2021 5:17 PM CST   Consents   Consent for Immunization Exchange :   Consent Granted   Consent for Immunizations to Providers :   Consent Granted   Bella Irby CMA - 2/8/2021 5:17 PM CST   Meds / Allergies   (As Of: 2/8/2021 5:21:03 PM CST)   Allergies (Active)   sulfa drugs  Estimated Onset Date:   Unspecified ; Reactions:   hives, SOB ; Created By:   April Elena CMA; Reaction Status:   Active ; Category:   Drug ; Substance:   sulfa drugs ; Type:   Allergy ; Updated By:   April Elena CMA; Reviewed Date:   7/5/2019 3:14 PM CDT        Medication List   (As Of: 2/8/2021 5:21:03 PM CST)   Prescription/Discharge Order    levothyroxine  :   levothyroxine ; Status:   Prescribed ; Ordered As Mnemonic:   levothyroxine 125 mcg (0.125 mg) oral tablet ; Simple Display Line:   125 mcg, 1 tab(s), Oral, daily, for 90 day(s), 90 tab(s), 3 Refill(s) ; Ordering Provider:   Dameon Ferguson MD; Catalog Code:   levothyroxine ; Order Dt/Tm:   10/12/2020 5:32:44 PM CDT          metFORMIN  :   metFORMIN ; Status:   Prescribed ; Ordered As Mnemonic:   metFORMIN  500 mg oral tablet ; Simple Display Line:   500 mg, 1 tab(s), Oral, bid, 60 tab(s), 0 Refill(s) ; Ordering Provider:   Dameon Ferguson MD; Catalog Code:   metFORMIN ; Order Dt/Tm:   1/4/2021 10:41:26 AM CST          Miscellaneous Rx Supply  :   Miscellaneous Rx Supply ; Status:   Prescribed ; Ordered As Mnemonic:   zonia contour lancets ; Simple Display Line:   See Instructions, testing 2x/ day, 1 box(es), 3 Refill(s) ; Ordering Provider:   Dameon Ferguson MD; Catalog Code:   Miscellaneous Rx Supply ; Order Dt/Tm:   11/4/2016 3:58:24 PM CDT          Miscellaneous Rx Supply  :   Miscellaneous Rx Supply ; Status:   Prescribed ; Ordered As Mnemonic:   contour test strips ; Simple Display Line:   See Instructions, test 2x/ day, 200 EA, 3 Refill(s) ; Ordering Provider:   Dameon Ferguson MD; Catalog Code:   Miscellaneous Rx Supply ; Order Dt/Tm:   11/4/2016 3:56:17 PM CDT            Home Meds    carbonyl iron  :   carbonyl iron ; Status:   Documented ; Ordered As Mnemonic:   Iron Chews ; Simple Display Line:   15 mg, po, daily, 0 Refill(s) ; Catalog Code:   carbonyl iron ; Order Dt/Tm:   1/25/2017 6:02:57 PM CST          multivitamin with fluoride  :   multivitamin with fluoride ; Status:   Documented ; Ordered As Mnemonic:   multivitamin with fluoride ; Simple Display Line:   daily, 0 Refill(s) ; Catalog Code:   multivitamin with fluoride ; Order Dt/Tm:   1/25/2017 6:03:04 PM CST          multivitamin with minerals  :   multivitamin with minerals ; Status:   Documented ; Ordered As Mnemonic:   Vitamin D with Minerals oral tablet ; Simple Display Line:   1 tab(s), po, daily, 0 Refill(s) ; Catalog Code:   multivitamin with minerals ; Order Dt/Tm:   1/25/2017 6:03:11 PM CST          naproxen  :   naproxen ; Status:   Documented ; Ordered As Mnemonic:   Aleve 220 mg oral tablet ; Simple Display Line:   440 mg, 2 tab(s), PO, q8hr, PRN: for headache, 60 tab(s), 0 Refill(s) ; Catalog Code:   naproxen ; Order Dt/Tm:    8/3/2018 2:00:00 PM CDT          omega-3 polyunsaturated fatty acids  :   omega-3 polyunsaturated fatty acids ; Status:   Documented ; Ordered As Mnemonic:   Fish Oil ; Simple Display Line:   po, 0 Refill(s) ; Catalog Code:   omega-3 polyunsaturated fatty acids ; Order Dt/Tm:   4/3/2018 12:56:11 PM CDT            ID Risk Screen   Recent Travel History :   No recent travel   Family Member Travel History :   No recent travel   Other Exposure to Infectious Disease :   Unknown   COVID-19 Testing Status :   No COVID-19 test performed   Bella Irby CMA - 2/8/2021 5:17 PM CST   Social History   Social History   (As Of: 2/8/2021 5:21:03 PM CST)   Alcohol:  Current      Current, Wine (5 oz), 1-2 times per week   (Last Updated: 5/10/2011 2:04:57 PM CDT by Judy Sanz)          Tobacco:  Past      Former smoker, quit more than 30 days ago   (Last Updated: 2/8/2021 5:19:54 PM CST by Bella Irby CMA)          Electronic Cigarette/Vaping:        Electronic Cigarette Use: Never.   (Last Updated: 2/8/2021 5:19:57 PM CST by Bella Irby CMA)          Substance Abuse:        Never   (Last Updated: 12/21/2011 4:42:18 PM CST by April Elena CMA)          Employment/School:        Employed, Work/School description: [employer].   (Last Updated: 12/21/2011 4:42:18 PM CST by April Elena CMA)          Home/Environment:        Marital status: .  Spouse/Partner name: Harsha.   (Last Updated: 12/21/2011 4:42:18 PM CST by April Elena CMA)          Exercise:  Regular exercise      Exercise type: Walking.   (Last Updated: 5/10/2011 2:05:16 PM CDT by Judy Sanz)   Exercise frequency: 3-4 times/week.  Exercise type: Walking.   (Last Updated: 12/21/2011 4:42:18 PM CST by April Elena CMA)          Sexual:        Sexually active: Yes.  Sexual orientation: Heterosexual.   (Last Updated: 12/21/2011 4:42:18 PM CST by April Elena CMA)

## 2022-02-16 NOTE — CARE COORDINATION
Pt appears on  [facility] chronic disease panel as out of parameters for not at goal due to no statin.  ZIMs 4-3-18 note states working on diet and weight loss.    Latrice Levin, CMA

## 2022-02-16 NOTE — TELEPHONE ENCOUNTER
---------------------  From: Dameon Ferguson MD   To: GABRIELE FINK    Sent: 6/3/2019 8:51:03 AM CDT    Your A1c is 7.1 and this is OK    If you have been taking your thyroid regularly we should increase the dose slightly or recheck in 3 months to verify level.  Let me know your preference.    Results:  Date Result Name Ind Value Ref Range   5/29/2019 6:10 PM Hgb A1c ((H)) 7.1 ( - <5.7)   5/29/2019 6:10 PM TSH ((H)) 8.74 mIU/L

## 2022-02-16 NOTE — PROGRESS NOTES
Chief Complaint    f/u Labs  History of Present Illness      43-year-old female here to discuss her recent labs patient admits she has not been eating as well and has not been active over the last several months.  She is hoping to restart.  Review of Systems      See HPI.  All other review of systems negative.  Physical Exam   Vitals & Measurements    HR: 64(Peripheral)  BP: 122/70     HT: 69.5 in  WT: 281.0 lb  BMI: 40.9       Patient is alert and oriented has hirsutism present on the Chans no rashes      Lungs are clear      Heart regular      No peripheral edema  Assessment/Plan       1. Morbid Obesity (E66.01)         Reminded her how her weight will improve with activity good eating as it has before       Hypothyroid (E03.9)         Discussed her mildly elevated TSH and we can increase her Synthroid to 120 mcg she will need a recheck in 2 to 4 months       Type 2 diabetes mellitus (E11.9)        Reviewed her goals of therapy I discussed her cholesterol she is not ready to try a statin agent at this time       Orders:         levothyroxine, = 1 tab(s) ( 125 mcg ), Oral, daily, # 90 tab(s), 1 Refill(s), Type: Maintenance, Pharmacy: [facility], 1 tab(s) Oral daily,x90 day(s), (Ordered)         Return to Clinic (Request), RFV: TSH, T4 and A1C, Return in 3 months  Patient Information     Name:GABRIELE FINK      Address:      [address] [place] [id number]     Sex:Female     YOB: 1975     [phone]     Emergency Contact:JERMAINE FINK     MRN:[id number]     FIN:[id number]     Location:[facility]     Date of Service:06/03/2019      Primary Care Physician:       Dameon Ferguson MD, [phone]      Attending Physician:       Dameon Ferguson MD, [phone]  Problem List/Past Medical History    Ongoing     Anemia, iron deficiency     Female Hirsutism     Hypothyroid     Menarche     Morbid Obesity     Obese     Refusal of statin medication by  patient     Tobacco user       Comments: pt quit 2009     Type 2 diabetes mellitus    Historical     Concussion       Comments: significant fall with mild head concussion and fracture of L4  Procedure/Surgical History     Laparoscopic cholecystectomy (09/19/2013)      Comments: Robotic assisted.     History of shoulder surgery (2006)  Medications        contour test strips: See Instructions, test 2x/ day, 200 EA, 3 Refill(s).        zonia contour lancets: See Instructions, testing 2x/ day, 1 box(es), 3 Refill(s).        Iron Chews: 15 mg, po, daily, 0 Refill(s).        multivitamin with fluoride: daily, 0 Refill(s).        Vitamin D with Minerals oral tablet: 1 tab(s), po, daily, 0 Refill(s).        Fish Oil: po, 0 Refill(s).        Aleve 220 mg oral tablet: 440 mg, 2 tab(s), PO, q8hr, PRN: for headache, 60 tab(s), 0 Refill(s).        amoxicillin 875 mg oral tablet: 875 mg, 1 tab(s), PO, BID, for 10 day(s), 20 tab(s), 0 Refill(s).        levothyroxine 125 mcg (0.125 mg) oral tablet: 125 mcg, 1 tab(s), Oral, daily, for 90 day(s), 90 tab(s), 1 Refill(s).         Allergies    sulfa drugs (hives, SOB)  Social History    Smoking Status - 06/03/2019     Never smoker     Alcohol - Current, 05/10/2011      Current, Wine (5 oz), 1-2 times per week, 05/10/2011     Employment and Education      Employed, Work/School description: [employer] 12/21/2011     Exercise and Physical Activity - Regular exercise, 05/10/2011      Exercise frequency: 3-4 times/week. Exercise type: Walking., 12/21/2011      Exercise type: Walking., 05/10/2011     Home and Environment      Marital status: . Spouse/Partner name: Harsha., 12/21/2011     Sexual      Sexually active: Yes. Sexual orientation: Heterosexual., 12/21/2011     Substance Abuse      Never, 12/21/2011     Tobacco - Past, 05/10/2011      Current, 05/10/2011  Family History    Cancer: Sister.    Diabetes mellitus type I: Father and Sister.    Hypertension:  Mother.    Lymphoma: Brother.    Obesity, morbid.: Sister.    Thyroid Ca: Mother and Brother.    Sister: History is negative  Immunizations      Vaccine Date Status      tetanus/diphth/pertuss (Tdap) adult/adol 12/13/2017 Given      tetanus/diphth/pertuss (Tdap) adult/adol 11/21/2007 Recorded  Lab Results          Lab Results (Last 4 results within 90 days)           Hgb A1c: 7.1 High (05/29/19 18:10:00)          TSH: 8.74 mIU/L High (05/29/19 18:10:00)

## 2022-02-16 NOTE — NURSING NOTE
Comprehensive Intake Entered On:  10/12/2020 5:21 PM CDT    Performed On:  10/12/2020 5:18 PM CDT by Bella Irby CMA               Summary   Chief Complaint :   Follow up   Advance Directive :   No   Menstrual Status :   Menarcheal   Weight Measured :   278.6 lb(Converted to: 278 lb 10 oz, 126.371 kg)    Height Measured :   69.5 in(Converted to: 5 ft 9 in, 176.53 cm)    Body Mass Index :   40.55 kg/m2 (HI)    Body Surface Area :   2.49 m2   Systolic Blood Pressure :   138 mmHg (HI)    Diastolic Blood Pressure :   72 mmHg   Mean Arterial Pressure :   94 mmHg   Peripheral Pulse Rate :   72 bpm   Bella Irby CMA - 10/12/2020 5:18 PM CDT   Health Status   Allergies Verified? :   Yes   Medication History Verified? :   Yes   Immunizations Current :   Yes   Pre-Visit Planning Status :   Completed   Tobacco Use? :   Never smoker   Bella Irby CMA - 10/12/2020 5:18 PM CDT   Consents   Consent for Immunization Exchange :   Consent Granted   Consent for Immunizations to Providers :   Consent Granted   Bella Irby CMA - 10/12/2020 5:18 PM CDT   Meds / Allergies   (As Of: 10/12/2020 5:21:24 PM CDT)   Allergies (Active)   sulfa drugs  Estimated Onset Date:   Unspecified ; Reactions:   hives, SOB ; Created By:   April Elena CMA; Reaction Status:   Active ; Category:   Drug ; Substance:   sulfa drugs ; Type:   Allergy ; Updated By:   April Elena CMA; Reviewed Date:   7/5/2019 3:14 PM CDT        Medication List   (As Of: 10/12/2020 5:21:24 PM CDT)   Prescription/Discharge Order    levothyroxine  :   levothyroxine ; Status:   Prescribed ; Ordered As Mnemonic:   levothyroxine 125 mcg (0.125 mg) oral tablet ; Simple Display Line:   125 mcg, 1 tab(s), Oral, daily, for 90 day(s), 90 tab(s), 3 Refill(s) ; Ordering Provider:   Dameon Ferguson MD; Catalog Code:   levothyroxine ; Order Dt/Tm:   1/6/2020 6:10:07 PM CST          Miscellaneous Rx Supply  :   Miscellaneous Rx Supply ; Status:    Prescribed ; Ordered As Mnemonic:   zonia contour lancets ; Simple Display Line:   See Instructions, testing 2x/ day, 1 box(es), 3 Refill(s) ; Ordering Provider:   Dameon Ferguson MD; Catalog Code:   Miscellaneous Rx Supply ; Order Dt/Tm:   11/4/2016 3:58:24 PM CDT          Miscellaneous Rx Supply  :   Miscellaneous Rx Supply ; Status:   Prescribed ; Ordered As Mnemonic:   contour test strips ; Simple Display Line:   See Instructions, test 2x/ day, 200 EA, 3 Refill(s) ; Ordering Provider:   Dameon Ferguson MD; Catalog Code:   Miscellaneous Rx Supply ; Order Dt/Tm:   11/4/2016 3:56:17 PM CDT            Home Meds    carbonyl iron  :   carbonyl iron ; Status:   Documented ; Ordered As Mnemonic:   Iron Chews ; Simple Display Line:   15 mg, po, daily, 0 Refill(s) ; Catalog Code:   carbonyl iron ; Order Dt/Tm:   1/25/2017 6:02:57 PM CST          multivitamin with fluoride  :   multivitamin with fluoride ; Status:   Documented ; Ordered As Mnemonic:   multivitamin with fluoride ; Simple Display Line:   daily, 0 Refill(s) ; Catalog Code:   multivitamin with fluoride ; Order Dt/Tm:   1/25/2017 6:03:04 PM CST          multivitamin with minerals  :   multivitamin with minerals ; Status:   Documented ; Ordered As Mnemonic:   Vitamin D with Minerals oral tablet ; Simple Display Line:   1 tab(s), po, daily, 0 Refill(s) ; Catalog Code:   multivitamin with minerals ; Order Dt/Tm:   1/25/2017 6:03:11 PM CST          naproxen  :   naproxen ; Status:   Documented ; Ordered As Mnemonic:   Aleve 220 mg oral tablet ; Simple Display Line:   440 mg, 2 tab(s), PO, q8hr, PRN: for headache, 60 tab(s), 0 Refill(s) ; Catalog Code:   naproxen ; Order Dt/Tm:   8/3/2018 2:00:00 PM CDT          omega-3 polyunsaturated fatty acids  :   omega-3 polyunsaturated fatty acids ; Status:   Documented ; Ordered As Mnemonic:   Fish Oil ; Simple Display Line:   po, 0 Refill(s) ; Catalog Code:   omega-3 polyunsaturated fatty acids ; Order Dt/Tm:   4/3/2018  12:56:11 PM CDT            ID Risk Screen   Recent Travel History :   No recent travel   Family Member Travel History :   No recent travel   Other Exposure to Infectious Disease :   Unknown   Bella Irby CMA - 10/12/2020 5:18 PM CDT

## 2022-02-16 NOTE — TELEPHONE ENCOUNTER
---------------------  From: Dameon Ferguson MD   To: GABRIELE FINK    Sent: 12/16/2019 11:54:27 AM CST  Subject: Patient Message - Results Notification        Your thyroid results are spot on.   Your A1c is showing stable blood glucose control   Your last result was also 7.1    Results:  Date Result Name Ind Value Ref Range   12/13/2019 8:27 AM Hgb A1c ((H)) 7.1 ( - <5.7)   12/13/2019 8:27 AM T4 Free  1.4 ng/dL (0.8 - 1.8)   12/13/2019 8:27 AM TSH  3.46 mIU/L

## 2022-02-16 NOTE — TELEPHONE ENCOUNTER
---------------------  From: Dameon Ferguson MD   To: GABRIELE FINK    Sent: 11/28/2021 5:15:50 PM CST  Subject: General Message     while you are working on weight loss,   i would try glucotrol XL 5mg po bid.   This is not expensive but will lower your blood sugar

## 2022-02-16 NOTE — TELEPHONE ENCOUNTER
---------------------  From: Dameon Ferguson MD   To: GABRIELE FINK    Sent: 11/15/2021 3:33:49 PM CST  Subject: General Message     I hope to see you soon.   The A1c of 11.0 is very hard on your body.  Your thyroid also needs adjusting      Results:  Date Result Name Ind Value Ref Range   11/12/2021 3:16 PM U Creatinine  See comment mg/dL    11/12/2021 3:16 PM U Microalbumin  See comment mg/dL    11/12/2021 3:16 PM Glucose Level ((H)) 266 mg/dL (65 - 99)   11/12/2021 3:16 PM BUN  14 mg/dL (7 - 25)   11/12/2021 3:16 PM Creatinine Level  0.82 mg/dL (0.50 - 1.10)   11/12/2021 3:16 PM eGFR  86 mL/min/1.73m2 (> OR = 60 - )   11/12/2021 3:16 PM eGFR African American  100 mL/min/1.73m2 (> OR = 60 - )   11/12/2021 3:16 PM BUN/Creat Ratio  NOT APPLICABLE (6 - 22)   11/12/2021 3:16 PM Sodium Level  135 mmol/L (135 - 146)   11/12/2021 3:16 PM Potassium Level  4.1 mmol/L (3.5 - 5.3)   11/12/2021 3:16 PM Chloride Level  100 mmol/L (98 - 110)   11/12/2021 3:16 PM CO2 Level  24 mmol/L (20 - 32)   11/12/2021 3:16 PM Calcium Level  9.5 mg/dL (8.6 - 10.2)   11/12/2021 3:16 PM TSH ((H)) 8.37 mIU/L    11/12/2021 3:16 PM Hgb A1c ((H)) 11.0 ( - <5.7)

## 2022-02-16 NOTE — TELEPHONE ENCOUNTER
---------------------  From: Natasha De Los Santos MA (Phone Messages Pool ([id number]_[facility]))   To: [facility] Message Pool ([id number] [facility] [place]);     Sent: 3/4/2021 11:08:18 AM CST  Subject: Antibiotic request     Phone Message    PCP:   FENG      Time of Call:  1001       Person Calling:  pt  Phone number:  [phone]    Reason for call:  pt c/o having tooth ache and is needing to see the dentist.  Has hx artificial knee and needs rx for prophylactic antibiotic prior to seeing dentist.  Please advise  Returned call at: _    Note:   _    Last office visit and reason:  2/8/2021 w/ FENG for CDM f/u    Transferred to: _---------------------  From: Bella Irby CMA ([facility] Message Pool ([id number]_[facility]))   To: Dameon Ferguson MD;     Sent: 3/4/2021 11:11:23 AM CST  Subject: FW: Antibiotic request---------------------  From: Dameon Ferguson MD   To: [facility] Message Pool ([id number]_[place] [facility] [place]);     Sent: 3/4/2021 1:44:46 PM CST  Subject: RE: Antibiotic request     amoxicillin 2gm po 1 hour prior to procedure.   refills 4LMTCB at 2:13pm. I wanted to let her know I sent this in2:45 pm  Patient returned call.  Notified that rx was sent to pharmacy.

## 2022-02-16 NOTE — TELEPHONE ENCOUNTER
---------------------  From: Dameon Ferguson MD   To: GABRIELE FINK    Sent: 10/4/2020 9:18:14 PM CDT  Subject: General Message     Your A1c is now 8.0 and has become worse.  We need to discuss and you should start medical therapy      Results:  Date Result Name Ind Value Ref Range   10/1/2020 8:24 AM Sodium Level  137 mmol/L (135 - 146)   10/1/2020 8:24 AM Potassium Level  4.3 mmol/L (3.5 - 5.3)   10/1/2020 8:24 AM Chloride Level  103 mmol/L (98 - 110)   10/1/2020 8:24 AM CO2 Level  28 mmol/L (20 - 32)   10/1/2020 8:24 AM Glucose Level ((H)) 170 mg/dL (65 - 99)   10/1/2020 8:24 AM BUN  12 mg/dL (7 - 25)   10/1/2020 8:24 AM Creatinine Level  0.55 mg/dL (0.50 - 1.10)   10/1/2020 8:24 AM BUN/Creat Ratio  NOT APPLICABLE (6 - 22)   10/1/2020 8:24 AM eGFR  115 mL/min/1.73m2 (> OR = 60 - )   10/1/2020 8:24 AM eGFR African American  133 mL/min/1.73m2 (> OR = 60 - )   10/1/2020 8:24 AM Calcium Level  9.5 mg/dL (8.6 - 10.2)   10/1/2020 8:24 AM Hgb A1c ((H)) 8.0 ( - <5.7)   10/1/2020 8:24 AM Cholesterol  188 mg/dL ( - <200)   10/1/2020 8:24 AM Non-HDL Cholesterol ((H)) 139 ( - <130)   10/1/2020 8:24 AM HDL ((L)) 49 mg/dL (> OR = 50 - )   10/1/2020 8:24 AM Cholesterol/HDL Ratio  3.8 ( - <5.0)   10/1/2020 8:24 AM LDL ((H)) 119    10/1/2020 8:24 AM Triglyceride  92 mg/dL ( - <150)   10/1/2020 8:24 AM TSH  4.45 mIU/L    10/1/2020 8:24 AM U Microalbumin  1.6 mg/dL (See Note: - )   10/1/2020 8:24 AM Microalbumin Comment  See comment    10/1/2020 8:24 AM WBC  9.2 (3.8 - 10.8)   10/1/2020 8:24 AM RBC  5.10 (3.80 - 5.10)   10/1/2020 8:24 AM Hgb  15.3 gm/dL (11.7 - 15.5)   10/1/2020 8:24 AM Hct ((H)) 45.1 % (35.0 - 45.0)   10/1/2020 8:24 AM MCV  88.4 fL (80.0 - 100.0)   10/1/2020 8:24 AM MCH  30.0 pg (27.0 - 33.0)   10/1/2020 8:24 AM MCHC  33.9 gm/dL (32.0 - 36.0)   10/1/2020 8:24 AM RDW  12.2 % (11.0 - 15.0)   10/1/2020 8:24 AM Platelet  227 (140 - 400)   10/1/2020 8:24 AM MPV  11.0 fL (7.5 - 12.5)   10/1/2020 8:24 AM  Lymphocytes  25.6 %    10/1/2020 8:24 AM Abs Lymphocytes  2,355 (850 - 3,900)   10/1/2020 8:24 AM Neutrophils  64.8 %    10/1/2020 8:24 AM Abs Neutrophils  5,962 (1,500 - 7,800)   10/1/2020 8:24 AM Monocytes  5.5 %    10/1/2020 8:24 AM Abs Monocytes  506 (200 - 950)   10/1/2020 8:24 AM Eosinophils  3.9 %    10/1/2020 8:24 AM Abs Eosinophils  359 (15 - 500)   10/1/2020 8:24 AM Basophils  0.2 %    10/1/2020 8:24 AM Abs Basophils  18 (0 - 200)

## 2022-02-16 NOTE — PROGRESS NOTES
Patient:   GABRIELE FINK            MRN: [id number]            FIN: [id number]               Age:   42 years     Sex:  Female     :  1975   Associated Diagnoses:   Hypothyroid; Morbid Obesity; Preop examination   Author:   Dameon Ferguson MD      Preoperative Information   Indication for surgery   Accompanied by   Source of history          Chief Complaint   4/3/2018 12:53 PM CDT    Pre-op for Left Knee Replacement on  with Dr. Mccallum at [facility].     see chief complaint as noted above and confirmed with the patient      Review of Systems   Constitutional:  No fever, No chills.    Eye   Ear/Nose/Mouth/Throat:  No nasal congestion, No sore throat.    Respiratory:  No shortness of breath, No cough.    Cardiovascular   Breast   Gastrointestinal:  No nausea, No vomiting, No diarrhea, No constipation.    Genitourinary:  No dysuria.    Gynecologic   Hematology/Lymphatics:  No bruising tendency, No swollen lymph glands.    Endocrine   Immunologic:  No recurrent fevers, No recurrent infections.    Musculoskeletal:  No muscle pain.    Integumentary:  No rash.    Neurologic:  No tingling, No headache.    Psychiatric   All other systems.     Health Status   Allergies:    Allergic Reactions (Selected)  Severity Not Documented  Sulfa drugs (Hives, sob)   Medications:  (Selected)   Prescriptions  Prescribed  zonia contour lancets: zonia contour lancets, See Instructions, Instructions: testing 2x/ day, Supply, # 1 box(es), 3 Refill(s), Type: Maintenance, Pharmacy: [facility], testing 2x/ day  contour test strips: contour test strips, See Instructions, Instructions: test 2x/ day, Supply, # 200 EA, 3 Refill(s), Type: Maintenance, Pharmacy: [facility], test 2x/ day  levothyroxine 100 mcg (0.1 mg) oral tablet: 1 tab(s) ( 100 mcg ), po, daily, # 90 tab(s), 3 Refill(s), Type: Maintenance, Pharmacy: [facility], 1 tab(s) po daily,x90 day(s)  Documented Medications  Documented  Fish Oil: po, 0 Refill(s),  "Type: Maintenance  Iron Chews: ( 15 mg ), po, daily, 0 Refill(s), Type: Maintenance  Vitamin D with Minerals oral tablet: 1 tab(s), po, daily, 0 Refill(s), Type: Maintenance  multivitamin with fluoride: daily, 0 Refill(s), Type: Maintenance   Problem list:    All Problems  Obese / 278.00 / Probable  Female Hirsutism / 704.1 / Confirmed  Anemia, iron deficiency / 280.9 / Confirmed  Hypothyroid / 244.9 / Confirmed  Morbid Obesity / 278.01 / Probable  Type 2 diabetes mellitus / 197761014 / Confirmed  Inactive: Menarche / V21.8  Inactive: Tobacco user / 305.1  pt  quit 2009  Resolved: Concussion / 850.9  significant fall with mild head concussion and fracture of L4      Histories   Past Medical History:    Active  Type 2 diabetes mellitus (197761014): Onset in the month of 9/2016 at 40 years  Resolved  Concussion (850.9): Onset on 9/20/2004 at 28 years.  Resolved.  Comments:  5/10/2011 CDT 2:02 PM CDT - Judy Sanz  significant fall with mild head concussion and fracture of L4   Family History:    Lymphoma  Brother  Hypertension  Mother  Diabetes mellitus type I  Father  Sister  Thyroid Ca  Mother  Brother  Cancer  Sister  Comments:  12/21/2011 4:40 PM - April Elena CMA  \"muscle cancer\"  Obesity, morbid.  Sister     Procedure history:    Laparoscopic cholecystectomy (SNOMED CT 82469219) performed by Sagar Thomas MD on 9/19/2013 at 37 Years.  Comments:  9/23/2013 10:41 AM - Alisha Goodwin RN  Robotic assisted  History of shoulder surgery (SNOMED CT 0512420719) in 2006 at 31 Years.   Social History:        Alcohol Assessment: Current            Current, Wine (5 oz), 1-2 times per week      Tobacco Assessment: Past            Current                     Comments:                      05/10/2011 - Judy Sanz                     quit      Substance Abuse Assessment            Never      Employment and Education Assessment            Employed, Work/School description: [employer].      Home " and Environment Assessment            Marital status: .  Spouse/Partner name: Harsha.      Exercise and Physical Activity Assessment: Regular exercise            Exercise type: Walking.            Exercise frequency: 3-4 times/week.  Exercise type: Walking.      Sexual Assessment            Sexually active: Yes.  Sexual orientation: Heterosexual.       Has  no history of anemia.  Has no history of DVT or pulmonary embolism.  Has no personal history of bleeding problems.   Has no personal or family history of anesthesia reactions.  Patientdoes not have active tuberculosis.    S/he has not taken aspirin or aspirin containing products in the last week.   counselled to hold  S/he  has not taken Plavix (Clopidogrel) in the last 2 weeks.    S/he  has not taken warfarin in the past week.    S/he has not been on corticosteroids for more than 2 weeks recently.      S/he is not DNR before, during or after surgery.    Chest pain / SOB walking up 2 flights of steps? no  Pain in neck or jaw? no  CAD MI?  no  Afib?  no  Heart Failure? no  Asthma  or Bronchitis? no  Diabetes?  yes and presently diet controlled       Insulin/Orals? no  Seizure Disorder?  no  CKD?  no  Thyroid Disease? yes on stable replacement  Liver Disease no  CVA? no         Physical Examination   Vital Signs   4/3/2018 12:53 PM CDT Peripheral Pulse Rate 74 bpm    Systolic Blood Pressure 124 mmHg    Diastolic Blood Pressure 72 mmHg    Mean Arterial Pressure 89 mmHg      Measurements from flowsheet : Measurements   4/3/2018 12:53 PM CDT Height Measured - Standard 69.5 in    Weight Measured - Standard 271.8 lb    BSA 2.46 m2    Body Mass Index 39.56 kg/m2  HI      General:  Alert and oriented, No acute distress.    Eye:  Pupils are equal, round and reactive to light, Normal conjunctiva.    HENT:  Oral mucosa is moist.    Neck:  Supple.    Respiratory:  Lungs are clear to auscultation, Respirations are non-labored.    Cardiovascular:  Normal rate, Regular  rhythm, No edema.    Gastrointestinal:  Soft, Non-distended.    Integumentary:  Warm, No rash.    Psychiatric:  Cooperative, Appropriate mood & affect, Normal judgment.       Review / Management            Impression and Plan   Diagnosis     Hypothyroid (OLK87-OG E03.9).     Morbid Obesity (TZP58-EC E66.01).     Preop examination (XTS38-IC Z01.818).     Condition:  ok to procedd with anesthesia and surgery  discussed continued weight loss and diet changes  .

## 2022-02-16 NOTE — PROGRESS NOTES
Patient:   GABRIELE FINK            MRN: [id number]            FIN: [id number]               Age:   42 years     Sex:  Female     :  1975   Associated Diagnoses:   None   Author:   Giovanni Ruggiero MD      Chief Complaint   8/3/2018 1:57 PM CDT     Patient is here for sore throat. Has had off and on for about a week. c/o coughing up yellow mucus.        History of Present Illness             The patient presents with nasal congestion.  The location is both sides.     It is described as a moderate amount and green in color.     chief complaint and symptoms as noted above confirmed with patient       Review of Systems   Constitutional:  No fever, No chills, No sweats.    Eye:  Negative.    Ear/Nose/Mouth/Throat:  Nasal congestion, Sinus pain, Sore throat.         Nasal discharge: Large amount, Green, Yellow.         Nasal obstruction: Bilateral.    Respiratory:  Cough.       Health Status   Allergies:    Allergic Reactions (Selected)  Severity Not Documented  Sulfa drugs (Hives, sob)   Medications:  (Selected)   Prescriptions  Prescribed  Azithromycin 5 Day Dose Pack 250 mg oral tablet: 2 tabs today then 1 a day for 4 days, PO, qAM, x 5 day(s), Instructions: as directed on package labeling, # 6 tab(s), 0 Refill(s), Type: Acute, Pharmacy: [facility], 2 tabs today then 1 a day for 4 days Oral qam,x5 day(s),Instr:as directed on pa...  zonia contour lancets: zonia contour lancets, See Instructions, Instructions: testing 2x/ day, Supply, # 1 box(es), 3 Refill(s), Type: Maintenance, Pharmacy: [facility], testing 2x/ day  contour test strips: contour test strips, See Instructions, Instructions: test 2x/ day, Supply, # 200 EA, 3 Refill(s), Type: Maintenance, Pharmacy: [facility], test 2x/ day  levothyroxine 100 mcg (0.1 mg) oral tablet: 1 tab(s) ( 100 mcg ), po, daily, # 90 tab(s), 1 Refill(s), Type: Maintenance, Pharmacy: [facility], 1 tab(s) po daily  Documented  "Medications  Documented  Aleve 220 mg oral tablet: 2 tab(s) ( 440 mg ), PO, q8hr, PRN: for headache, # 60 tab(s), 0 Refill(s), Type: Maintenance  Fish Oil: po, 0 Refill(s), Type: Maintenance  Iron Chews: ( 15 mg ), po, daily, 0 Refill(s), Type: Maintenance  Vitamin D with Minerals oral tablet: 1 tab(s), po, daily, 0 Refill(s), Type: Maintenance  multivitamin with fluoride: daily, 0 Refill(s), Type: Maintenance   Problem list:    All Problems  Anemia, iron deficiency / ICD-9-.9 / Confirmed  Female Hirsutism / ICD-9-.1 / Confirmed  Hypothyroid / ICD-9-.9 / Confirmed  Morbid Obesity / ICD-9-.01 / Probable  Obese / ICD-9-.00 / Probable  Type 2 diabetes mellitus / SNOMED CT 200735391 / Confirmed  Inactive: Menarche / ICD-9-CM V21.8  Inactive: Tobacco user / ICD-9-.1  Resolved: Concussion / ICD-9-.9      Histories   Past Medical History:    Active  Type 2 diabetes mellitus (977007400): Onset in the month of 9/2016 at 40 years  Resolved  Concussion (850.9): Onset on 9/20/2004 at 28 years.  Resolved.  Comments:  5/10/2011 CDT 2:02 PM CDT - Judy Sanz  significant fall with mild head concussion and fracture of L4   Family History:    Lymphoma  Brother  Hypertension  Mother  Diabetes mellitus type I  Father  Sister  Thyroid Ca  Mother  Brother  Cancer  Sister  Comments:  12/21/2011 4:40 PM - April Elena CMA  \"muscle cancer\"  Obesity, morbid.  Sister     Procedure history:    Laparoscopic cholecystectomy (SNOMED CT 48232009) performed by Sagar Thomas MD on 9/19/2013 at 37 Years.  Comments:  9/23/2013 10:41 AM - Alisha Goodwin RN  Robotic assisted  History of shoulder surgery (SNOMED CT 5770718482) in 2006 at 31 Years.   Social History:        Alcohol Assessment: Current            Current, Wine (5 oz), 1-2 times per week      Tobacco Assessment: Past            Current                     Comments:                      05/10/2011 - Judy Sanz                     " quit      Substance Abuse Assessment            Never      Employment and Education Assessment            Employed, Work/School description: [employer].      Home and Environment Assessment            Marital status: .  Spouse/Partner name: Harsha.      Exercise and Physical Activity Assessment: Regular exercise            Exercise type: Walking.            Exercise frequency: 3-4 times/week.  Exercise type: Walking.      Sexual Assessment            Sexually active: Yes.  Sexual orientation: Heterosexual.  ,        Alcohol Assessment: Current            Current, Wine (5 oz), 1-2 times per week      Tobacco Assessment: Past            Current                     Comments:                      05/10/2011 - Judy Sanz                     quit      Substance Abuse Assessment            Never      Employment and Education Assessment            Employed, Work/School description: [employer].      Home and Environment Assessment            Marital status: .  Spouse/Partner name: Harsha.      Exercise and Physical Activity Assessment: Regular exercise            Exercise type: Walking.            Exercise frequency: 3-4 times/week.  Exercise type: Walking.      Sexual Assessment            Sexually active: Yes.  Sexual orientation: Heterosexual.        Physical Examination   Vital Signs   8/3/2018 1:57 PM CDT Temperature Tympanic 98.2 DegF    Peripheral Pulse Rate 85 bpm    HR Method Electronic    Systolic Blood Pressure 137 mmHg  HI    Diastolic Blood Pressure 84 mmHg  HI    Mean Arterial Pressure 102 mmHg    BP Site Right arm    BP Method Electronic    Oxygen Saturation 98 %      Measurements from flowsheet : Measurements   8/3/2018 1:57 PM CDT     Weight Measured - Standard                273.6 lb     General:  Alert and oriented, No acute distress.    Eye:  Normal conjunctiva.    HENT:  Normocephalic, Tympanic membranes are clear.         Ear: Both ears, Within  normal limits.         Nose: Both nostrils, Patent, Discharge ( Moderate amount, Green ), Turbinates ( Boggy, Erythematous ).         Throat: Tonsils ( Erythematous ), Pharynx ( Erythematous ).    Neck:  Supple, Non-tender, No lymphadenopathy.    Respiratory:  Lungs are clear to auscultation.       Impression and Plan   Diagnosis   Course:  Worsening.    Orders     Orders (Selected)   Outpatient Orders  Ordered  Return to Clinic (Request): RFV: DM check, Return in 6 months  Return to Clinic (Request): RFV: pap smear, Return in soon  Return to Clinic (Request): RFV: recheck thyroid, Return in 6 months  Prescriptions  Prescribed  Azithromycin 5 Day Dose Pack 250 mg oral tablet: 2 tabs today then 1 a day for 4 days, PO, qAM, x 5 day(s), Instructions: as directed on package labeling, # 6 tab(s), 0 Refill(s), Type: Acute, Pharmacy: [facility], 2 tabs today then 1 a day for 4 days Oral qam,x5 day(s),Instr:as directed on pa...  zonia contour lancets: zonia contour lancets, See Instructions, Instructions: testing 2x/ day, Supply, # 1 box(es), 3 Refill(s), Type: Maintenance, Pharmacy: [facility], testing 2x/ day  contour test strips: contour test strips, See Instructions, Instructions: test 2x/ day, Supply, # 200 EA, 3 Refill(s), Type: Maintenance, Pharmacy: [facility], test 2x/ day  levothyroxine 100 mcg (0.1 mg) oral tablet: 1 tab(s) ( 100 mcg ), po, daily, # 90 tab(s), 1 Refill(s), Type: Maintenance, Pharmacy: [facility], 1 tab(s) po daily  Documented Medications  Documented  Aleve 220 mg oral tablet: 2 tab(s) ( 440 mg ), PO, q8hr, PRN: for headache, # 60 tab(s), 0 Refill(s), Type: Maintenance  Fish Oil: po, 0 Refill(s), Type: Maintenance  Iron Chews: ( 15 mg ), po, daily, 0 Refill(s), Type: Maintenance  Vitamin D with Minerals oral tablet: 1 tab(s), po, daily, 0 Refill(s), Type: Maintenance  multivitamin with fluoride: daily, 0 Refill(s), Type: Maintenance.     Plan:       Follow-up: With Primary  Care Provider, As needed or sooner if symptoms worsen.    Counseled:  Patient, Regarding diagnosis, Regarding treatment, Regarding medications.    Patient Instructions:  Launch follow-up (if licensed).

## 2022-02-16 NOTE — PROGRESS NOTES
Patient:   GABRIELE FINK            MRN: [id number]            FIN: [id number]               Age:   42 years     Sex:  Female     :  1975   Associated Diagnoses:   Type 2 diabetes mellitus; Hypothyroid   Author:   Dameon Ferguson MD      Chief Complaint   2017 6:22 PM CST   f/u blood work      History of Present Illness   See chief complaint as noted above and confirmed with the patient.    42 year old female patient present for follow up evaluation of diabetes.  The quality of the patients diabetes is described as being  unchanged from previous visit.  Patients diet is described as healthy and weight has remained stable.  Patients activity level is described as regular. She has had to decrease amount of exercise due to left knee pain.  She has a goal to loose another 50lbs.     BG Testing / Frequency:  runs mostly in the 80s and 90s.  Treatment/Medications (insulin or non-insulin / oral):  exercise and diet  Last HgbA1c date and result:  2017: 6.1  Blood glucose readings have been reviewed.     Thyroid: Currently taking levothyroxine    Left knee: still having pain, has been seeing a orthopedic and doing cortizone injections. She has done three of them and they are only lasting for a month at a time. She is at a point to do a replacement but hasn't comitted to doing it yet. Is taking aleve twice a day for the pain. Did have  a few  episodes of stomach aches with it and does stop taking it when this occurs or if she has a day where she is not up on her feet all day.    Has family history of breast cancer. 2 maternal aunts           Review of Systems   Constitutional:  No fever, No chills, No decreased activity.    Ear/Nose/Mouth/Throat:  Negative.    Respiratory:  No shortness of breath, No cough.    Cardiovascular:  Negative.    Gastrointestinal:  No nausea, No vomiting.    Endocrine:  No polyuria, No hyperglycemia, No hypoglycemia.    Musculoskeletal:  left knee pain.    Integumentary:   Negative.    Neurologic:  Negative.    Psychiatric:  Negative.              Health Status   Allergies:    Allergic Reactions (Selected)  Severity Not Documented  Sulfa drugs (Hives, sob)   Medications:  (Selected)   Prescriptions  Prescribed  zonia contour lancets: zonia contour lancets, See Instructions, Instructions: testing 2x/ day, Supply, # 1 box(es), 3 Refill(s), Type: Maintenance, Pharmacy: [facility], testing 2x/ day  contour test strips: contour test strips, See Instructions, Instructions: test 2x/ day, Supply, # 200 EA, 3 Refill(s), Type: Maintenance, Pharmacy: [facility], test 2x/ day  levothyroxine 100 mcg (0.1 mg) oral tablet: 1 tab(s) ( 100 mcg ), po, daily, # 90 tab(s), 3 Refill(s), Type: Maintenance, Pharmacy: [facility], 1 tab(s) po daily,x90 day(s)  Documented Medications  Documented  Iron Chews: ( 15 mg ), po, daily, 0 Refill(s), Type: Maintenance  Vitamin D with Minerals oral tablet: 1 tab(s), po, daily, 0 Refill(s), Type: Maintenance  multivitamin with fluoride: daily, 0 Refill(s), Type: Maintenance   Problem list:    All Problems  Type 2 diabetes mellitus / SNOMED CT 904973971 / Confirmed  Obese / ICD-9-.00 / Probable  Morbid Obesity / ICD-9-.01 / Probable  Hypothyroid / ICD-9-.9 / Confirmed  Female Hirsutism / ICD-9-.1 / Confirmed  Anemia, iron deficiency / ICD-9-.9 / Confirmed  Inactive: Tobacco user / ICD-9-.1  Inactive: Menarche / ICD-9-CM V21.8  Resolved: Concussion / ICD-9-.9      Histories   Past Medical History:    Active  Type 2 diabetes mellitus (400779055): Onset in the month of 9/2016 at 40 years  Resolved  Concussion (850.9): Onset on 9/20/2004 at 28 years.  Resolved.  Comments:  5/10/2011 CDT 2:02 PM CDT - Judy Sanz  significant fall with mild head concussion and fracture of L4   Family History:    Lymphoma  Brother  Hypertension  Mother  Diabetes mellitus type I  Father  Sister  Thyroid  "Ca  Mother  Brother  Cancer  Sister  Comments:  12/21/2011 4:40 PM - April Elena CMA  \"muscle cancer\"  Obesity, morbid.  Sister     Procedure history:    Laparoscopic cholecystectomy ([id number]) on 9/19/2013 at 37 Years.  Comments:  9/23/2013 10:41 AM - Alisha Goodwin RN  Robotic assisted  History of shoulder surgery ([id number]) in 2006 at 31 Years.   Social History:        Alcohol Assessment: Current            Current, Wine (5 oz), 1-2 times per week      Tobacco Assessment: Past            Current                     Comments:                      05/10/2011 - Judy Sanz                     quit      Substance Abuse Assessment            Never      Employment and Education Assessment            Employed, Work/School description: [employer].      Home and Environment Assessment            Marital status: .  Spouse/Partner name: Harsha.      Exercise and Physical Activity Assessment: Regular exercise            Exercise type: Walking.            Exercise frequency: 3-4 times/week.  Exercise type: Walking.      Sexual Assessment            Sexually active: Yes.  Sexual orientation: Heterosexual.        Physical Examination   Vital Signs   12/13/2017 6:22 PM CST Peripheral Pulse Rate 64 bpm    Systolic Blood Pressure 146 mmHg  HI    Diastolic Blood Pressure 88 mmHg    Mean Arterial Pressure 107 mmHg      Measurements from flowsheet : Measurements   12/13/2017 6:22 PM CST Height Measured - Standard 69.5 in    Weight Measured - Standard 265.6 lb    BSA 2.43 m2    Body Mass Index 38.66 kg/m2      General:  Alert and oriented, No acute distress.    Eye:  Pupils are equal, round and reactive to light, Normal conjunctiva.    HENT:  Normocephalic, Oral mucosa is moist, No pharyngeal erythema.    Neck:  Supple, Non-tender, No lymphadenopathy.    Respiratory:  Lungs are clear to auscultation, Respirations are non-labored.    Cardiovascular:  Normal rate, Regular rhythm, Normal peripheral " perfusion, No edema.    Gastrointestinal:  Soft, Non-tender, Non-distended, No organomegaly.    Musculoskeletal:  Normal range of motion, Normal strength, No swelling, Normal gait.    Integumentary:  Warm, No rash.    Feet:  Normal by visual exam, Normal pulses.         Sensory status: Bilateral, Within normal limits, By monofilament exam.    Neurologic:  Alert, Oriented.    Psychiatric:  Cooperative, Appropriate mood & affect, Normal judgment.       Review / Management   Results review:  Lab results   12/8/2017 3:55 PM CST Hgb A1c 6.1  HI    U Microalbumin 0.3 mg/dL    Ur Creatinine 35 mg/dL    Ur Microalbumin/Creatinine Ratio 9   .       Impression and Plan   Diagnosis     Type 2 diabetes mellitus (ANY52-RE E11.9).     Hypothyroid (WOL21-VS E03.9).     Plan:  Diabetes is controlled well with her weight loss  Previous lab results reviewed.    Medications, diet, and activity discussed.  Continue present medications; refills sent to pharmacy as needed.  Discussed starting a statin.  she is aware of recommendations.   She declines at this time and will continue with diet and activity to control  offered flu shot and tetnus shot. She declines flu shot. updated tetnus shot.  Will have her return to do pap.  Discussed that due to family history that she should start doing mammograms and repeat them every 4 years.    Follow up in 6 months for visit and labs according to chronic disease guidelines.     .      I, Joselyn Irby [facility] [address] [facility], acted solely as a scribe for, and in the presence of Dr. Dameon Ferguson who performed the service.

## 2022-02-16 NOTE — PROGRESS NOTES
Patient:   GABRIELE FINK            MRN: [id number]            FIN: [id number]               Age:   43 years     Sex:  Female     :  1975   Associated Diagnoses:   Tick bite of groin   Author:   Giovanni Ruggiero MD      Visit Information      Date of Service: 2019 02:33 pm  Performing Location: [facility]  Encounter#: [id number]      Primary Care Provider (PCP):  Dameon Ferguson MD    NPI# [id number]      Referring Provider:  Giovanni Ruggiero MD    NPI# [id number]      Chief Complaint   2019 3:05 PM CDT     removed a deer tick from right groin area last night        History of Present Illness   chief complaint and symptoms as noted above confirmed with patient   no sxs  spends time in [facility]      Review of Systems   Constitutional:  Negative except as documented in history of present illness.    Musculoskeletal:  Negative.    Integumentary:  Negative except as documented in history of present illness.    Neurologic:  Negative.       Health Status   Allergies:    Allergic Reactions (Selected)  Severity Not Documented  Sulfa drugs (Hives, sob)   Medications:  (Selected)   Prescriptions  Prescribed  zonia contour lancets: zonia contour lancets, See Instructions, Instructions: testing 2x/ day, Supply, # 1 box(es), 3 Refill(s), Type: Maintenance, Pharmacy: [facility], testing 2x/ day  contour test strips: contour test strips, See Instructions, Instructions: test 2x/ day, Supply, # 200 EA, 3 Refill(s), Type: Maintenance, Pharmacy: [facility], test 2x/ day  doxycycline monohydrate 100 mg oral capsule: = 1 cap(s) ( 100 mg ), Oral, bid, x 7 day(s), # 14 cap(s), 1 Refill(s), Type: Acute, Pharmacy: [facility], 1 cap(s) Oral bid,x7 day(s)  levothyroxine 125 mcg (0.125 mg) oral tablet: = 1 tab(s) ( 125 mcg ), Oral, daily, # 90 tab(s), 1 Refill(s), Type: Maintenance, Pharmacy: [facility], 1 tab(s) Oral daily,x90 day(s)  Documented Medications  Documented  Aleve  220 mg oral tablet: 2 tab(s) ( 440 mg ), PO, q8hr, PRN: for headache, # 60 tab(s), 0 Refill(s), Type: Maintenance  Fish Oil: po, 0 Refill(s), Type: Maintenance  Iron Chews: ( 15 mg ), po, daily, 0 Refill(s), Type: Maintenance  Vitamin D with Minerals oral tablet: 1 tab(s), po, daily, 0 Refill(s), Type: Maintenance  multivitamin with fluoride: daily, 0 Refill(s), Type: Maintenance,    Medications          *denotes recorded medication          contour test strips: See Instructions, test 2x/ day, 200 EA, 3 Refill(s).          zonia contour lancets: See Instructions, testing 2x/ day, 1 box(es), 3 Refill(s).          *Iron Chews: 15 mg, po, daily, 0 Refill(s).          doxycycline monohydrate 100 mg oral capsule: 100 mg, 1 cap(s), Oral, bid, for 7 day(s), 14 cap(s), 1 Refill(s).          levothyroxine 125 mcg (0.125 mg) oral tablet: 125 mcg, 1 tab(s), Oral, daily, for 90 day(s), 90 tab(s), 1 Refill(s).          *multivitamin with fluoride: daily, 0 Refill(s).          *Vitamin D with Minerals oral tablet: 1 tab(s), po, daily, 0 Refill(s).          *Aleve 220 mg oral tablet: 440 mg, 2 tab(s), PO, q8hr, PRN: for headache, 60 tab(s), 0 Refill(s).          *Fish Oil: po, 0 Refill(s).     Problem list:    All Problems (Selected)  Anemia, iron deficiency / ICD-9-.9 / Confirmed  Female Hirsutism / ICD-9-.1 / Confirmed  Hypothyroid / ICD-9-.9 / Confirmed  Morbid Obesity / ICD-9-.01 / Probable  Obese / ICD-9-.00 / Probable  Refusal of statin medication by patient / SNOMED CT 973920585 / Confirmed  Type 2 diabetes mellitus / SNOMED CT 243426923 / Confirmed      Histories   Past Medical History:    Active  Type 2 diabetes mellitus (197761014): Onset in the month of 9/2016 at 40 years  Resolved  Concussion (850.9): Onset on 9/20/2004 at 28 years.  Resolved.  Comments:  5/10/2011 CDT 2:02 PM CDT - Judy Sanz  significant fall with mild head concussion and fracture of L4   Family History:   "  Lymphoma  Brother  Hypertension  Mother  Diabetes mellitus type I  Father  Sister  Thyroid Ca  Mother  Brother  Cancer  Sister  Comments:  12/21/2011 4:40 PM CST - April Elena CMA  \"muscle cancer\"  Obesity, morbid.  Sister     Procedure history:    Laparoscopic cholecystectomy (SNOMED CT 07036476) performed by Sagar Thomas MD on 9/19/2013 at 37 Years.  Comments:  9/23/2013 10:41 AM CDT - Alisha Goodwin RN  Robotic assisted  History of shoulder surgery (SNOMED CT 4400471040) in 2006 at 31 Years.   Social History:        Alcohol Assessment: Current            Current, Wine (5 oz), 1-2 times per week      Tobacco Assessment: Past            Current            Former smoker, quit more than 30 days ago      Substance Abuse Assessment            Never      Employment and Education Assessment            Employed, Work/School description: [employer].      Home and Environment Assessment            Marital status: .  Spouse/Partner name: Harsha.      Exercise and Physical Activity Assessment: Regular exercise            Exercise type: Walking.            Exercise frequency: 3-4 times/week.  Exercise type: Walking.      Sexual Assessment            Sexually active: Yes.  Sexual orientation: Heterosexual.      Physical Examination   Vital Signs   7/5/2019 3:05 PM CDT Temperature Tympanic 98.7 DegF    Peripheral Pulse Rate 64 bpm    Systolic Blood Pressure 142 mmHg  HI    Diastolic Blood Pressure 78 mmHg    Mean Arterial Pressure 99 mmHg      Measurements from flowsheet : Measurements   7/5/2019 3:05 PM CDT     Weight Measured - Standard                281.6 lb     General:  Alert and oriented, No acute distress.    Integumentary:  tick bite right groin.    Neurologic:  Alert, Oriented.       Impression and Plan   Diagnosis     Tick bite of groin (HGD34-LE S30.861A).     Course:  Progressing as expected.    Plan:  discussed Lyme and doxycycline.    Patient Instructions:       Counseled: " Patient, Regarding diagnosis, Regarding treatment, Regarding medications, Activity.

## 2022-02-16 NOTE — TELEPHONE ENCOUNTER
---------------------  From: Suzanne Bae LPN (Phone Messages Pool ([id number]_[facility]))   To: [facility] Message Pool ([id number]_[place] [facility] [place]);     Sent: 11/24/2021 8:06:19 AM CST  Subject: CONSUMER MESSAGE FW: Medication           ---------------------  From: GABRIELE FINK  To: [facility]  Sent: 11/24/2021 05:46 a.m. CST  Subject: Medication  Dr. ABREU,    I SAT DOWN WITH MY PHARMACIST.   EVERY ONE OF THE MEDS WOULD BE $700-800 OUT OF POCKET A MONTH.  I LOOKED UP EVERY COUPON I COULD FIND.  I CAN NOT AFFORD THAT SO I WILL HAVE TO WORK ON EXERCISE INSTEAD.---------------------  From: Magali SALDIVAR, Natasha ([facility] [employer] ([id number]_[facility]))   To: Dameon Abreu MD;     Sent: 11/24/2021 8:55:20 AM CST  Subject: FW: CONSUMER MESSAGE FW: Medication

## 2022-02-16 NOTE — PROGRESS NOTES
Chief Complaint    Back pain-saw chiropractor, f/u labs  History of Present Illness       Patient is here worried about her back pain.  Its intermittent but has not responded well to chiropractic care are stretching.  She generally has not been very active.  And she has gained some weight recently.       Unfortunately her A1c has increased to 11.0 and her thyroid was mildly elevated at 8.37  Review of Systems       No bowel or bladder dysfunction  Physical Exam   Vitals & Measurements    T: 98.3  F (Tympanic)  BP: 138/84  SpO2: 95%     WT: 292 lb        Alert and oriented       Normal gait       Getting up and off the chair well       No local tenderness over the back and no rashes       Lungs are clear to auscultation  Assessment/Plan       1. Low back pain (M54.50)          We discussed how she has been an active gaining weight blood sugars are high and probably increase muscle problems she needs to do some regular exercises and stretching.  I think physical therapy is likely to be helpful for her I did x-ray her back and see normal lumbar spine film without acute problem       2. Type 2 diabetes mellitus (E11.9)          Ideally she would be able to start Ozempic if this medication is not affordable on her insurance plan we may try an alternative like Glucotrol XL but just as important will be her increasing her activity and having weight loss I recommend a recheck of her A1c Chem-8 in 3 months also she is due a CBC because of her history of anemia at that time.         Also I did increase her thyroid medication to 137 mcg and she should have a TSH in 2 to 3 months  Patient Information     Name:GABRIELE FINK      Address:      [address] [place] [id number]     Sex:Female     YOB: 1975     [phone]     Emergency Contact:JERMAINE FINK     MRN:[id number]     FIN:[id number]     Location:[facility]     Date of Service:11/23/2021      Primary Care Physician:       Phyllis  Dameon CONSTANTINO, [phone]      Attending Physician:       Dameon Ferguson MD, [phone]  Problem List/Past Medical History    Ongoing     Anemia, iron deficiency     Female Hirsutism     Hypothyroid     Menarche     Morbid Obesity     Obese     Refusal of statin medication by patient     Tobacco user       Comments: pt quit 2009     Type 2 diabetes mellitus    Historical     Concussion       Comments: significant fall with mild head concussion and fracture of L4  Procedure/Surgical History     Laparoscopic cholecystectomy (09/19/2013)      Comments: Robotic assisted.     History of shoulder surgery (2006)  Medications    Aleve 220 mg oral tablet, 440 mg= 2 tab(s), Oral, q8 hrs, PRN    amoxicillin 500 mg oral tablet, 2000 mg= 4 tab(s), Oral, once, 3 refills    zonia contour lancets, See Instructions, 3 refills    contour test strips, See Instructions, 3 refills    Fish Oil, Oral    Iron Chews, 15 mg, Oral, daily    levothyroxine 137 mcg (0.137 mg) oral tablet, 137 mcg= 1 tab(s), Oral, daily, 3 refills    metFORMIN 500 mg oral tablet, 500 mg= 1 tab(s), Oral, bid, 3 refills    multivitamin with fluoride, daily    Ozempic (1 mg dose) 4 mg/3 mL subcutaneous solution, 1 mg, Subcutaneous, qweek, 3 refills  Allergies    sulfa drugs (hives, SOB)  Social History    Smoking Status     Never smoker     Alcohol - Current      Current, Wine (5 oz), 1-2 times per week     Electronic Cigarette/Vaping      Electronic Cigarette Use: Never.     Employment/School      Employed, Work/School description: [employer].     Exercise - Regular exercise      Exercise frequency: 3-4 times/week. Exercise type: Walking.     Home/Environment      Marital status: . Spouse/Partner name: Harsha.     Sexual      Sexually active: Yes. Sexual orientation: Heterosexual.     Substance Abuse      Never     Tobacco - Past      Former smoker, quit more than 30 days ago  Family History    Cancer: Sister.    Diabetes mellitus  type I: Father and Sister.    Hypertension: Mother.    Lymphoma: Brother.    Obesity, morbid.: Sister.    Thyroid Ca: Mother and Brother.    Sister: History is negative  Lab Results          Lab Results (Last 4 results within 90 days)           Sodium Level: 135 mmol/L [135 mmol/L - 146 mmol/L] (11/12/21 15:16:00)          Potassium Level: 4.1 mmol/L [3.5 mmol/L - 5.3 mmol/L] (11/12/21 15:16:00)          Chloride Level: 100 mmol/L [98 mmol/L - 110 mmol/L] (11/12/21 15:16:00)          CO2 Level: 24 mmol/L [20 mmol/L - 32 mmol/L] (11/12/21 15:16:00)          Glucose Level: 266 mg/dL High [65 mg/dL - 99 mg/dL] (11/12/21 15:16:00)          BUN: 14 mg/dL [7 mg/dL - 25 mg/dL] (11/12/21 15:16:00)          Creatinine Level: 0.82 mg/dL [0.5 mg/dL - 1.1 mg/dL] (11/12/21 15:16:00)          BUN/Creat Ratio: NOT APPLICABLE [6  - 22] (11/12/21 15:16:00)          eGFR: 86 mL/min/1.73m2 (11/12/21 15:16:00)          eGFR African American: 100 mL/min/1.73m2 (11/12/21 15:16:00)          Calcium Level: 9.5 mg/dL [8.6 mg/dL - 10.2 mg/dL] (11/12/21 15:16:00)          Hgb A1c: 11 High (11/12/21 15:16:00)          TSH: 8.37 mIU/L High (11/12/21 15:16:00)          U Creatinine: See comment (11/12/21 15:16:00)          U Microalbumin: See comment (11/12/21 15:16:00)  Immunizations          Other Immunizations          Administration Dosage Date(s)          tetanus/diphth/pertuss (Tdap) adult/adol          11/21/2007, 12/14/2017

## 2022-02-16 NOTE — PROGRESS NOTES
Patient:   GABRIELE FINK            MRN: [id number]            FIN: [id number]               Age:   44 years     Sex:  Female     :  1975   Associated Diagnoses:   Type 2 diabetes mellitus; Hypothyroid   Author:   Dameon Ferguson MD      Chief Complaint      History of Present Illness   See chief complaint as noted above and confirmed with the patient.    44 year old female patient present for follow up evaluation of diabetes.  The quality of the patients diabetes is described as being  unchanged from previous visit.  Patients diet is described as healthy and weight has remained stable.  Patients activity level is described as regular. She has had to decrease amount of exercise due to left knee pain.  She has a goal to loose another 50lbs.     BG Testing / Frequency:  150 usually  Treatment/Medications (insulin or non-insulin / oral):  exercise and diet  Last HgbA1c date and result:  2017: 6.1,,  2020  8.0  Blood glucose readings have been reviewed.     Thyroid: Currently taking levothyroxine    Left knee: doing well with biking               Review of Systems   Constitutional:  No fever, No chills, No decreased activity.    Ear/Nose/Mouth/Throat:  Negative.    Respiratory:  No shortness of breath, No cough.    Cardiovascular:  Negative.    Gastrointestinal:  No nausea, No vomiting.    Endocrine:  No polyuria, No hyperglycemia, No hypoglycemia.    Musculoskeletal:  left knee pain.    Integumentary:  Negative.    Neurologic:  Negative.    Psychiatric:  Negative.              Health Status   Allergies:    Allergic Reactions (Selected)  Severity Not Documented  Sulfa drugs (Hives, sob)   Medications:  (Selected)   Prescriptions  Prescribed  Azithromycin 5 Day Dose Pack 250 mg oral tablet: 2 tabs today then 1 a day for 4 days, PO, qAM, x 5 day(s), Instructions: as directed on package labeling, # 6 tab(s), 0 Refill(s), Type: Acute, Pharmacy: [facility], 2 tabs today then 1 a day for 4 days Oral  qam,x5 day(s),Instr:as directed on pa.Phylicia.  zonia contour lancets: zonia contour lancets, See Instructions, Instructions: testing 2x/ day, Supply, # 1 box(es), 3 Refill(s), Type: Maintenance, Pharmacy: [facility], testing 2x/ day  contour test strips: contour test strips, See Instructions, Instructions: test 2x/ day, Supply, # 200 EA, 3 Refill(s), Type: Maintenance, Pharmacy: [facility], test 2x/ day  levothyroxine 125 mcg (0.125 mg) oral tablet: = 1 tab(s) ( 125 mcg ), Oral, daily, # 90 tab(s), 3 Refill(s), Type: Maintenance, Pharmacy: [facility], Due for labs and visit, 1 tab(s) Oral daily,x90 day(s), 69.5, in, 10/12/20 17:18:00 CDT, Height Measured, 278.6, lb, 10/12/20 17:18:00 CDT, W...  metFORMIN 500 mg oral tablet: = 1 tab(s) ( 500 mg ), Oral, bid, # 180 tab(s), 0 Refill(s), Type: Maintenance, Pharmacy: [facility], 1 tab(s) Oral bid, 69.5, in, 10/12/20 17:18:00 CDT, Height Measured, 278.6, lb, 10/12/20 17:18:00 CDT, Weight Measured  Documented Medications  Documented  Aleve 220 mg oral tablet: 2 tab(s) ( 440 mg ), PO, q8hr, PRN: for headache, # 60 tab(s), 0 Refill(s), Type: Maintenance  Fish Oil: po, 0 Refill(s), Type: Maintenance  Iron Chews: ( 15 mg ), po, daily, 0 Refill(s), Type: Maintenance  Vitamin D with Minerals oral tablet: 1 tab(s), po, daily, 0 Refill(s), Type: Maintenance  multivitamin with fluoride: daily, 0 Refill(s), Type: Maintenance,    Medications          *denotes recorded medication          contour test strips: See Instructions, test 2x/ day, 200 EA, 3 Refill(s).          zonia contour lancets: See Instructions, testing 2x/ day, 1 box(es), 3 Refill(s).          Azithromycin 5 Day Dose Pack 250 mg oral tablet: 2 tabs today then 1 a day for 4 days, PO, qAM, for 5 day(s), as directed on package labeling, 6 tab(s), 0 Refill(s).          *Iron Chews: 15 mg, po, daily, 0 Refill(s).          levothyroxine 125 mcg (0.125 mg) oral tablet: 125 mcg, 1 tab(s), Oral, daily, for 90  "day(s), 90 tab(s), 3 Refill(s).          metFORMIN 500 mg oral tablet: 500 mg, 1 tab(s), Oral, bid, 180 tab(s), 0 Refill(s).          *multivitamin with fluoride: daily, 0 Refill(s).          *Vitamin D with Minerals oral tablet: 1 tab(s), po, daily, 0 Refill(s).          *Aleve 220 mg oral tablet: 440 mg, 2 tab(s), PO, q8hr, PRN: for headache, 60 tab(s), 0 Refill(s).          *Fish Oil: po, 0 Refill(s).       Problem list:    All Problems  Obese / 278.00 / Probable  Anemia, iron deficiency / 280.9 / Confirmed  Hypothyroid / 244.9 / Confirmed  Morbid Obesity / 278.01 / Probable  Refusal of statin medication by patient / 635051395 / Confirmed  Female Hirsutism / 704.1 / Confirmed  Type 2 diabetes mellitus / 574016505 / Confirmed  Inactive: Tobacco user / 305.1  pt  quit 2009  Inactive: Menarche / V21.8  Resolved: Concussion / 850.9  significant fall with mild head concussion and fracture of L4      Histories   Past Medical History:    Active  Type 2 diabetes mellitus (447063890): Onset in the month of 9/2016 at 40 years  Resolved  Concussion (850.9): Onset on 9/20/2004 at 28 years.  Resolved.  Comments:  5/10/2011 CDT 2:02 PM CDT - Judy Sanz  significant fall with mild head concussion and fracture of L4   Family History:    Lymphoma  Brother  Hypertension  Mother  Diabetes mellitus type I  Father  Sister  Thyroid Ca  Mother  Brother  Cancer  Sister  Comments:  12/21/2011 4:40 PM CST - April Elena CMA  \"muscle cancer\"  Obesity, morbid.  Sister     Procedure history:    Laparoscopic cholecystectomy (SNOMED CT 14227486) performed by Sagar Thomas MD on 9/19/2013 at 37 Years.  Comments:  9/23/2013 10:41 AM CDT - Alisha Goodwin RN  Robotic assisted  History of shoulder surgery (SNOMED CT 1749644364) in 2006 at 31 Years.   Social History:        Alcohol Assessment: Current            Current, Wine (5 oz), 1-2 times per week      Tobacco Assessment: Past            Current            Former smoker, quit more " than 30 days ago      Substance Abuse Assessment            Never      Employment and Education Assessment            Employed, Work/School description: [employer].      Home and Environment Assessment            Marital status: .  Spouse/Partner name: Harsha.      Exercise and Physical Activity Assessment: Regular exercise            Exercise type: Walking.            Exercise frequency: 3-4 times/week.  Exercise type: Walking.      Sexual Assessment            Sexually active: Yes.  Sexual orientation: Heterosexual.        Physical Examination   Vital Signs   10/12/2020 5:18 PM CDT Peripheral Pulse Rate 72 bpm    Systolic Blood Pressure 138 mmHg  HI    Diastolic Blood Pressure 72 mmHg    Mean Arterial Pressure 94 mmHg      Measurements from flowsheet : Measurements   10/12/2020 5:18 PM CDT Height Measured 69.5 in    Weight Measured 278.6 lb    BSA 2.49 m2    Body Mass Index 40.55 kg/m2  HI      General:  Alert and oriented, No acute distress.    Eye:  Pupils are equal, round and reactive to light, Normal conjunctiva.    HENT:  Normocephalic, Oral mucosa is moist, No pharyngeal erythema.    Neck:  Supple, Non-tender, No lymphadenopathy.    Respiratory:  Lungs are clear to auscultation, Respirations are non-labored.    Cardiovascular:  Normal rate, Regular rhythm, Normal peripheral perfusion, No edema.    Gastrointestinal:  Soft, Non-tender, Non-distended, No organomegaly.    Musculoskeletal:  Normal range of motion, Normal strength, No swelling, Normal gait.    Integumentary:  Warm, No rash.    Feet:  Normal by visual exam, Normal pulses.         Sensory status: Bilateral, Within normal limits, By monofilament exam.    Neurologic:  Alert, Oriented.    Psychiatric:  Cooperative, Appropriate mood & affect, Normal judgment.       Review / Management   Results review      Impression and Plan   Diagnosis     Type 2 diabetes mellitus (PGN52-JV E11.9).     Hypothyroid (MOJ07-YY E03.9).     Plan:   she has gained weight and had decreased activity during pandemic  she has started to be more active  she will start metformin  goal weight loss of 20 pounds  recheck A1c in 3-4 months.      I, Joselyn Irby CMA, acted solely as a scribe for, and in the presence of Dr. Dameon Ferguosn who performed the service.

## 2022-02-16 NOTE — NURSING NOTE
Comprehensive Intake Entered On:  11/23/2021 10:36 AM CST    Performed On:  11/23/2021 10:32 AM CST by Evon Luciano CMA               Summary   Chief Complaint :   Back pain-saw chiropractor, f/u labs   Evon Luciano CMA - 11/23/2021 10:39 AM CST     Advance Directive :   No   Menstrual Status :   Menarcheal   Weight Measured :   292 lb(Converted to: 292 lb 0 oz, 132.449 kg)    Systolic Blood Pressure :   138 mmHg (HI)    Diastolic Blood Pressure :   84 mmHg (HI)    Mean Arterial Pressure :   102 mmHg   BP Site :   Right arm   Pulse Site :   Radial artery   BP Method :   Manual   HR Method :   Electronic   Temperature Tympanic :   98.3 DegF(Converted to: 36.8 DegC)    Oxygen Saturation :   95 %   Evon Luciano CMA - 11/23/2021 10:32 AM CST   Health Status   Allergies Verified? :   Yes   Medication History Verified? :   Yes   Immunizations Current :   Yes   Medical History Verified? :   Yes   Pre-Visit Planning Status :   Completed   Evon Luciano CMA - 11/23/2021 10:32 AM CST   Consents   Consent for Immunization Exchange :   Consent Granted   Consent for Immunizations to Providers :   Consent Granted   Evon Luciano CMA - 11/23/2021 10:32 AM CST   Meds / Allergies   (As Of: 11/23/2021 10:36:27 AM CST)   Allergies (Active)   sulfa drugs  Estimated Onset Date:   Unspecified ; Reactions:   hives, SOB ; Created By:   April Elena CMA; Reaction Status:   Active ; Category:   Drug ; Substance:   sulfa drugs ; Type:   Allergy ; Updated By:   April Elena CMA; Reviewed Date:   11/23/2021 10:33 AM CST        Medication List   (As Of: 11/23/2021 10:36:27 AM CST)   Prescription/Discharge Order    amoxicillin  :   amoxicillin ; Status:   Prescribed ; Ordered As Mnemonic:   amoxicillin 500 mg oral tablet ; Simple Display Line:   2,000 mg, 4 tab(s), Oral, once, 1 hr prior to dental procedure, 4 tab(s), 3 Refill(s) ; Ordering Provider:   Dameon Ferguson MD; Catalog Code:   amoxicillin ; Order Dt/Tm:   3/4/2021  2:02:57 PM CST          metFORMIN  :   metFORMIN ; Status:   Prescribed ; Ordered As Mnemonic:   metFORMIN 500 mg oral tablet ; Simple Display Line:   500 mg, 1 tab(s), Oral, bid, for 90 day(s), 180 tab(s), 3 Refill(s) ; Ordering Provider:   Dameon Ferguson MD; Catalog Code:   metFORMIN ; Order Dt/Tm:   2/8/2021 5:30:37 PM CST          levothyroxine  :   levothyroxine ; Status:   Prescribed ; Ordered As Mnemonic:   levothyroxine 125 mcg (0.125 mg) oral tablet ; Simple Display Line:   125 mcg, 1 tab(s), Oral, daily, for 90 day(s), 90 tab(s), 3 Refill(s) ; Ordering Provider:   aDmeon Ferguson MD; Catalog Code:   levothyroxine ; Order Dt/Tm:   10/12/2020 5:32:44 PM CDT          Miscellaneous Rx Supply  :   Miscellaneous Rx Supply ; Status:   Prescribed ; Ordered As Mnemonic:   zonia contour lancets ; Simple Display Line:   See Instructions, testing 2x/ day, 1 box(es), 3 Refill(s) ; Ordering Provider:   Dameon Ferguson MD; Catalog Code:   Miscellaneous Rx Supply ; Order Dt/Tm:   11/4/2016 3:58:24 PM CDT          Miscellaneous Rx Supply  :   Miscellaneous Rx Supply ; Status:   Prescribed ; Ordered As Mnemonic:   contour test strips ; Simple Display Line:   See Instructions, test 2x/ day, 200 EA, 3 Refill(s) ; Ordering Provider:   Dameon Ferguson MD; Catalog Code:   Miscellaneous Rx Supply ; Order Dt/Tm:   11/4/2016 3:56:17 PM CDT            Home Meds    naproxen  :   naproxen ; Status:   Documented ; Ordered As Mnemonic:   Aleve 220 mg oral tablet ; Simple Display Line:   440 mg, 2 tab(s), PO, q8hr, PRN: for headache, 60 tab(s), 0 Refill(s) ; Catalog Code:   naproxen ; Order Dt/Tm:   8/3/2018 2:00:00 PM CDT          omega-3 polyunsaturated fatty acids  :   omega-3 polyunsaturated fatty acids ; Status:   Documented ; Ordered As Mnemonic:   Fish Oil ; Simple Display Line:   po, 0 Refill(s) ; Catalog Code:   omega-3 polyunsaturated fatty acids ; Order Dt/Tm:   4/3/2018 12:56:11 PM CDT          carbonyl iron  :    carbonyl iron ; Status:   Documented ; Ordered As Mnemonic:   Iron Chews ; Simple Display Line:   15 mg, po, daily, 0 Refill(s) ; Catalog Code:   carbonyl iron ; Order Dt/Tm:   1/25/2017 6:02:57 PM CST          multivitamin with fluoride  :   multivitamin with fluoride ; Status:   Documented ; Ordered As Mnemonic:   multivitamin with fluoride ; Simple Display Line:   daily, 0 Refill(s) ; Catalog Code:   multivitamin with fluoride ; Order Dt/Tm:   1/25/2017 6:03:04 PM CST          multivitamin with minerals  :   multivitamin with minerals ; Status:   Processing ; Ordered As Mnemonic:   Vitamin D with Minerals oral tablet ; Action Display:   Complete ; Catalog Code:   multivitamin with minerals ; Order Dt/Tm:   11/23/2021 10:33:35 AM CST            ID Risk Screen   Recent Travel History :   No recent travel   Family Member Travel History :   No recent travel   Other Exposure to Infectious Disease :   Unknown   COVID-19 Testing Status :   No positive COVID-19 test   Evon Luciano CMA - 11/23/2021 10:32 AM CST   Social History   Social History   (As Of: 11/23/2021 10:36:27 AM CST)   Alcohol:  Current      Current, Wine (5 oz), 1-2 times per week   (Last Updated: 5/10/2011 2:04:57 PM CDT by Judy Sanz)          Tobacco:  Past      Former smoker, quit more than 30 days ago   (Last Updated: 2/8/2021 5:19:54 PM CST by Bella Irby CMA)          Electronic Cigarette/Vaping:        Electronic Cigarette Use: Never.   (Last Updated: 2/8/2021 5:19:57 PM CST by Bella Irby CMA)          Substance Abuse:        Never   (Last Updated: 12/21/2011 4:42:18 PM CST by April Elena CMA)          Employment/School:        Employed, Work/School description: [employer].   (Last Updated: 12/21/2011 4:42:18 PM CST by April Elena CMA)          Home/Environment:        Marital status: .  Spouse/Partner name: Harsha.   (Last Updated: 12/21/2011 4:42:18 PM CST by April Elena CMA)           Exercise:  Regular exercise      Exercise type: Walking.   (Last Updated: 5/10/2011 2:05:16 PM CDT by Judy Sanz)   Exercise frequency: 3-4 times/week.  Exercise type: Walking.   (Last Updated: 12/21/2011 4:42:18 PM CST by April Eelna CMA)          Sexual:        Sexually active: Yes.  Sexual orientation: Heterosexual.   (Last Updated: 12/21/2011 4:42:18 PM CST by April Elena CMA)

## 2022-02-16 NOTE — TELEPHONE ENCOUNTER
---------------------  From: Jamia Zhou CMA ([facility] Messages Pool ([id number]))   To: [facility] Message Pool ([id number]);     Sent: 5/28/2019 8:11:58 AM CDT  Subject: CONSUMER MESSAGE FW: Thyroid           ---------------------  From: GABRIELE FINK  To: [facility]  Sent: 05/25/2019 06:49 a.m. CDT  Subject: Thyroid  I have been very tired the last 2 months sleeping more than I normally do.  Should my thyroid level be checked also when I have my blood work done next week?---------------------  From: Bella Irby CMA ([facility] Message Pool ([facility]24[facility]))   To: Dameon Ferguson MD;     Sent: 5/28/2019 8:19:08 AM CDT  Subject: FW: CONSUMER MESSAGE FW: Thyroid---------------------  From: Dameon Ferguson MD   To: [facility] Message Pool ([id number]);     Sent: 5/28/2019 9:06:40 AM CDT  Subject: RE: CONSUMER MESSAGE FW: Thyroid     yes we should check your thyroid---------------------  From: Bella Irby CMA ([facility] Message Pool ([id number]))   To: GABRIELE FINK    Sent: 5/28/2019 9:12:22 AM CDT  Subject: FW: CONSUMER MESSAGE FW: Thyroid     Good Morning Gabriele,  See Dr. Ferguson's response.  I put a order in for you to have it checked .  Have a nice day,  Joselyn

## 2022-02-16 NOTE — LETTER
(Inserted Image. Unable to display)   January 13, 2021        GABRIELE FINK  [address] [place], [place] [id number]        Dear GABRIELE,      Thank you for selecting [facility] for your healthcare needs.    Our records indicate you are due for the following services:     Diabetic Exam ~ Please bring your glucose meter and/or your blood glucose diary to your appointment.    (FYI   Regarding office visits: In some instances, a video visit or telephone visit may be offered as an option.)        To schedule an appointment or if you have further questions, please contact your clinic at [phone].      Powered by [employer]    Sincerely,    Dameon Ferguson MD

## 2022-02-16 NOTE — CARE COORDINATION
Pt appears on [facility] chronic disease panel as out of parameters for LDL/statin.  Per chart note 12/13/2017, Discussed starting a statin.  she is aware of recommendations.    She declines at this time and will continue with diet and activity to control. She continues to decline statin per 10/18/18  chart note. Lab 10/18/18 , A1C 6.2, Placed RTC in 6 months

## 2022-02-16 NOTE — LETTER
(Inserted Image. Unable to display)   August 09, 2021    GABRIELE FINK  [address] [place] [address]            Dear GABRIELE,      Thank you for selecting [facility] for your healthcare needs.    Our records indicate you are due for the following services:    Diabetic Exam ~ Please bring your glucose meter and/or your blood glucose diary to your appointment.    Non-Fasting Labs.    If you had your labs done at another facility or with Direct Access Lab Testing at [facility], please bring in a copy of the results to your next visit, mail a copy, or drop off a copy of your results to your Healthcare Provider.    (FYI   Regarding office visits: In some instances, a video visit or telephone visit may be offered as an option.)    You are due for lab work and an office visit; please schedule the lab appointment 1 week before the office visit.  This will assure all results are available to discuss with your Healthcare Provider during your visit.    **It is very helpful if you bring your medication bottles to your appointment.  This assures we have all of your current medications, including strength and dosing information, documented accurately in your medical record.    To schedule an appointment or if you have further questions, please contact your clinic at [phone].      Powered by [employer]    Sincerely,    Dameon Ferguson MD

## 2022-02-16 NOTE — PROGRESS NOTES
Patient:   GABRIELE FINK            MRN: [id number]            FIN: [id number]               Age:   42 years     Sex:  Female     :  1975   Associated Diagnoses:   Infection of knee; History of left knee replacement; Type 2 diabetes mellitus; Hypothyroid; Morbid Obesity   Author:   Dameon Ferguson MD      Chief Complaint      History of Present Illness   see chief complaint as noted above and confirmed with the patient     42 year old female presents today with concern of infection in left knee. Gabriele had a left knee replacement in 2018, the surgery went well, she went to physical therapy and everything went well. On  she noticed the left knee was quite stiff, the knee has been swelling, it is warm to the touch, she had a fever yesturday. There is a boil present on the inside of the leg behind the knee. She is able to walk ok and move the knee, she has been elevating the left leg at night.       Review of Systems   Constitutional:  Fever, No weakness.    Ear/Nose/Mouth/Throat:  No nasal congestion, No sore throat.    Respiratory:  No shortness of breath, No cough.    Cardiovascular:  No chest pain.    Gastrointestinal:  No nausea, No vomiting, No diarrhea.    Musculoskeletal:  left knee pain-swelling-stiffness-warmth.    Integumentary:  No rash.    Neurologic:  Alert and oriented X4, No headache.       Health Status   Allergies:    Allergic Reactions (Selected)  Severity Not Documented  Sulfa drugs (Hives, sob)   Medications:  (Selected)   Prescriptions  Prescribed  zonia contour lancets: zonia contour lancets, See Instructions, Instructions: testing 2x/ day, Supply, # 1 box(es), 3 Refill(s), Type: Maintenance, Pharmacy: [facility], testing 2x/ day  contour test strips: contour test strips, See Instructions, Instructions: test 2x/ day, Supply, # 200 EA, 3 Refill(s), Type: Maintenance, Pharmacy: [facility], test 2x/ day  levothyroxine 100 mcg (0.1 mg) oral tablet: 1 tab(s) ( 100 mcg  "), po, daily, # 90 tab(s), 1 Refill(s), Type: Maintenance, Pharmacy: [facility], 1 tab(s) po daily  Documented Medications  Documented  Aleve 220 mg oral tablet: 2 tab(s) ( 440 mg ), PO, q8hr, PRN: for headache, # 60 tab(s), 0 Refill(s), Type: Maintenance  Fish Oil: po, 0 Refill(s), Type: Maintenance  Iron Chews: ( 15 mg ), po, daily, 0 Refill(s), Type: Maintenance  Vitamin D with Minerals oral tablet: 1 tab(s), po, daily, 0 Refill(s), Type: Maintenance  multivitamin with fluoride: daily, 0 Refill(s), Type: Maintenance   Problem list:    All Problems  Type 2 diabetes mellitus / SNOMED CT [id number] / Confirmed  Obese / ICD-9-.00 / Probable  Morbid Obesity / ICD-9-.01 / Probable  Hypothyroid / ICD-9-.9 / Confirmed  Female Hirsutism / ICD-9-.1 / Confirmed  Anemia, iron deficiency / ICD-9-.9 / Confirmed  Inactive: Tobacco user / ICD-9-.1  Inactive: Menarche / ICD-9-CM V21.8  Resolved: Concussion / ICD-9-.9      Histories   Past Medical History:    Active  Type 2 diabetes mellitus (461281964): Onset in the month of 9/2016 at 40 years  Resolved  Concussion (850.9): Onset on 9/20/2004 at 28 years.  Resolved.  Comments:  5/10/2011 CDT 2:02 PM CDT - Judy Sanz  significant fall with mild head concussion and fracture of L4   Family History:    Lymphoma  Brother  Hypertension  Mother  Diabetes mellitus type I  Father  Sister  Thyroid Ca  Mother  Brother  Cancer  Sister  Comments:  12/21/2011 4:40 PM - April Elena CMA  \"muscle cancer\"  Obesity, morbid.  Sister     Procedure history:    Laparoscopic cholecystectomy ([id number]) on 9/19/2013 at 37 Years.  Comments:  9/23/2013 10:41 AM - Alisha Goodwin RN  Robotic assisted  History of shoulder surgery ([id number]) in 2006 at 31 Years.   Social History:        Alcohol Assessment: Current            Current, Wine (5 oz), 1-2 times per week      Tobacco Assessment: Past            Current                     Comments:            "           05/10/2011 - Yvon Judy                     quit      Substance Abuse Assessment            Never      Employment and Education Assessment            Employed, Work/School description: [employer].      Home and Environment Assessment            Marital status: .  Spouse/Partner name: Harsha.      Exercise and Physical Activity Assessment: Regular exercise            Exercise type: Walking.            Exercise frequency: 3-4 times/week.  Exercise type: Walking.      Sexual Assessment            Sexually active: Yes.  Sexual orientation: Heterosexual.        Physical Examination   Vital Signs   10/18/2018 8:13 AM CDT Temperature Tympanic 98.1 DegF    Peripheral Pulse Rate 89 bpm    Pulse Site Radial artery    Systolic Blood Pressure 118 mmHg    Diastolic Blood Pressure 70 mmHg    Mean Arterial Pressure 86 mmHg    BP Site Right arm    Oxygen Saturation 97 %      Measurements from flowsheet : Measurements   10/18/2018 8:13 AM CDT   Height Measured - Standard                69.5 in     General:  Alert and oriented, No acute distress.    Eye:  Pupils are equal, round and reactive to light, Normal conjunctiva.    HENT:  Oral mucosa is moist.    Neck:  Supple.    Respiratory:  Respirations are non-labored.    Cardiovascular:  Normal rate, Regular rhythm, No edema.    Gastrointestinal:  Non-distended.    Musculoskeletal:  Normal gait.    Integumentary:  Warm, No rash.    Psychiatric:  Cooperative, Appropriate mood & affect, Normal judgment.       Review / Management   Results review      Impression and Plan       Diagnosis     Infection of knee (XMU41-MC M00.9).     History of left knee replacement (HUA82-SV Z96.652).     Plan:  treated with Keflex, due to allergy to Sulfa Drugs advised her to return if fevers continue, if pain, swelling, and stiffness continues in the knee.     .    Soni DEVI Medical Assistant acted solely as a scribe for, and in presence of Dr. Xiao  "Phyllis who performed the services.     Diagnosis     Type 2 diabetes mellitus (UXH33-SO E11.9).     Hypothyroid (BHK32-BM E03.9).     Morbid Obesity (EEV42-KL E66.01).     Plan:  she is checking blood sugars but is not on any diabetic medications at this time, she has been loosing some weight and working on eating better. Last A1C was done in 4-2018 at \"6.3\" , she is fasting today and will have labs work done.     She is taking Levothyroxine 100mcg's daily will recheck TSH today.     .  "

## 2022-03-02 VITALS
BODY MASS INDEX: 41.33 KG/M2 | DIASTOLIC BLOOD PRESSURE: 101 MMHG | HEIGHT: 70 IN | TEMPERATURE: 98.7 F | SYSTOLIC BLOOD PRESSURE: 187 MMHG | WEIGHT: 288.7 LBS | HEART RATE: 93 BPM

## 2022-03-02 NOTE — NURSING NOTE
Comprehensive Intake Entered On:  1/31/2022 5:48 PM CST    Performed On:  1/31/2022 5:41 PM CST by Magali SALDIVAR, Natasha               Summary   Chief Complaint :   f/u BG readings, meds not working well.   Advance Directive :   No   Menstrual Status :   Menarcheal   Weight Measured :   288.7 lb(Converted to: 288 lb 11 oz, 130.952 kg)    Height Measured :   69.5 in(Converted to: 5 ft 9 in, 176.53 cm)    Body Mass Index :   42.02 kg/m2 (HI)    Body Surface Area :   2.53 m2   Systolic Blood Pressure :   187 mmHg (HI)    Diastolic Blood Pressure :   101 mmHg (HI)    Mean Arterial Pressure :   130 mmHg   Peripheral Pulse Rate :   93 bpm   BP Site :   Right arm   Pulse Site :   Brachial artery   BP Method :   Electronic   HR Method :   Electronic   Temperature Tympanic :   98.7 DegF(Converted to: 37.1 DegC)    Natasha De Los Santos MA - 1/31/2022 5:41 PM CST   Health Status   Allergies Verified? :   Yes   Medication History Verified? :   Yes   Immunizations Current :   Yes   Medical History Verified? :   Yes   Pre-Visit Planning Status :   Not completed   Tobacco Use? :   Former smoker   Natasha De Los Santos MA - 1/31/2022 5:41 PM CST   Consents   Consent for Immunization Exchange :   Consent Granted   Consent for Immunizations to Providers :   Consent Granted   Natasha De Los Santos MA - 1/31/2022 5:41 PM CST   Meds / Allergies   (As Of: 1/31/2022 5:48:28 PM CST)   Allergies (Active)   sulfa drugs  Estimated Onset Date:   Unspecified ; Reactions:   hives, SOB ; Created By:   April Elena CMA; Reaction Status:   Active ; Category:   Drug ; Substance:   sulfa drugs ; Type:   Allergy ; Updated By:   April Elena CMA; Reviewed Date:   11/23/2021 10:33 AM CST        Medication List   (As Of: 1/31/2022 5:48:28 PM CST)   Prescription/Discharge Order    amoxicillin  :   amoxicillin ; Status:   Prescribed ; Ordered As Mnemonic:   amoxicillin 500 mg oral tablet ; Simple Display Line:   2,000 mg, 4 tab(s), Oral, once, 1 hr prior to dental  procedure, 4 tab(s), 3 Refill(s) ; Ordering Provider:   Dameon Ferguson MD; Catalog Code:   amoxicillin ; Order Dt/Tm:   3/4/2021 2:02:57 PM CST          glipiZIDE  :   glipiZIDE ; Status:   Prescribed ; Ordered As Mnemonic:   Glucotrol XL 5 mg oral tablet, extended release ; Simple Display Line:   5 mg, 1 tab(s), Oral, daily, 90 tab(s), 0 Refill(s) ; Ordering Provider:   Dameon Ferguson MD; Catalog Code:   glipiZIDE ; Order Dt/Tm:   11/29/2021 3:51:23 PM CST          levothyroxine  :   levothyroxine ; Status:   Prescribed ; Ordered As Mnemonic:   levothyroxine 137 mcg (0.137 mg) oral tablet ; Simple Display Line:   137 mcg, 1 tab(s), Oral, daily, previous manufacture for continuity preferred, 90 tab(s), 3 Refill(s) ; Ordering Provider:   Dameon Ferguson MD; Catalog Code:   levothyroxine ; Order Dt/Tm:   11/23/2021 11:01:45 AM CST          metFORMIN  :   metFORMIN ; Status:   Prescribed ; Ordered As Mnemonic:   metFORMIN 500 mg oral tablet ; Simple Display Line:   500 mg, 1 tab(s), Oral, bid, for 90 day(s), 180 tab(s), 3 Refill(s) ; Ordering Provider:   Dameon Ferguson MD; Catalog Code:   metFORMIN ; Order Dt/Tm:   11/23/2021 11:03:31 AM CST          Miscellaneous Rx Supply  :   Miscellaneous Rx Supply ; Status:   Prescribed ; Ordered As Mnemonic:   zonia contour lancets ; Simple Display Line:   See Instructions, testing 2x/ day, 1 box(es), 3 Refill(s) ; Ordering Provider:   Dameon Ferguson MD; Catalog Code:   Miscellaneous Rx Supply ; Order Dt/Tm:   11/4/2016 3:58:24 PM CDT          Miscellaneous Rx Supply  :   Miscellaneous Rx Supply ; Status:   Prescribed ; Ordered As Mnemonic:   contour test strips ; Simple Display Line:   See Instructions, test 2x/ day, 200 EA, 3 Refill(s) ; Ordering Provider:   Dameon Ferguson MD; Catalog Code:   Miscellaneous Rx Supply ; Order Dt/Tm:   11/4/2016 3:56:17 PM CDT          semaglutide  :   semaglutide ; Status:   Prescribed ; Ordered As Mnemonic:   Ozempic (1 mg dose)  4 mg/3 mL subcutaneous solution ; Simple Display Line:   1 mg, Subcutaneous, qweek, 1 EA, 3 Refill(s) ; Ordering Provider:   Dameon Ferguson MD; Catalog Code:   semaglutide ; Order Dt/Tm:   11/23/2021 11:04:49 AM CST            Home Meds    carbonyl iron  :   carbonyl iron ; Status:   Documented ; Ordered As Mnemonic:   Iron Chews ; Simple Display Line:   15 mg, po, daily, 0 Refill(s) ; Catalog Code:   carbonyl iron ; Order Dt/Tm:   1/25/2017 6:02:57 PM CST          multivitamin with fluoride  :   multivitamin with fluoride ; Status:   Documented ; Ordered As Mnemonic:   multivitamin with fluoride ; Simple Display Line:   daily, 0 Refill(s) ; Catalog Code:   multivitamin with fluoride ; Order Dt/Tm:   1/25/2017 6:03:04 PM CST          naproxen  :   naproxen ; Status:   Completed ; Ordered As Mnemonic:   Aleve 220 mg oral tablet ; Simple Display Line:   440 mg, 2 tab(s), PO, q8hr, PRN: for headache, 60 tab(s), 0 Refill(s) ; Catalog Code:   naproxen ; Order Dt/Tm:   8/3/2018 2:00:00 PM CDT          omega-3 polyunsaturated fatty acids  :   omega-3 polyunsaturated fatty acids ; Status:   Documented ; Ordered As Mnemonic:   Fish Oil ; Simple Display Line:   po, 0 Refill(s) ; Catalog Code:   omega-3 polyunsaturated fatty acids ; Order Dt/Tm:   4/3/2018 12:56:11 PM CDT            Social History   Social History   (As Of: 1/31/2022 5:48:28 PM CST)   Alcohol:  Current      Current, Wine (5 oz), 1-2 times per week   (Last Updated: 5/10/2011 2:04:57 PM CDT by Judy Sanz)          Tobacco:  Past      Former smoker, quit more than 30 days ago   (Last Updated: 2/8/2021 5:19:54 PM CST by Bella Irby CMA)          Electronic Cigarette/Vaping:        Electronic Cigarette Use: Never.   (Last Updated: 2/8/2021 5:19:57 PM CST by Bella Irby CMA)          Substance Abuse:        Never   (Last Updated: 12/21/2011 4:42:18 PM CST by April Elena CMA)          Employment/School:        Employed, Work/School  description: [employer].   (Last Updated: 12/21/2011 4:42:18 PM CST by April Elena CMA)          Home/Environment:        Marital status: .  Spouse/Partner name: Harsha.   (Last Updated: 12/21/2011 4:42:18 PM CST by Arpil Elena CMA)          Exercise:  Regular exercise      Exercise type: Walking.   (Last Updated: 5/10/2011 2:05:16 PM CDT by Judy Sanz)   Exercise frequency: 3-4 times/week.  Exercise type: Walking.   (Last Updated: 12/21/2011 4:42:18 PM CST by April Elena CMA)          Sexual:        Sexually active: Yes.  Sexual orientation: Heterosexual.   (Last Updated: 12/21/2011 4:42:18 PM CST by April Elena CMA)

## 2022-03-02 NOTE — PROGRESS NOTES
Chief Complaint    f/u BG readings, meds not working well.  History of Present Illness       Patient is here to discuss her health.  She is very disappointed because her average blood glucoses have been around 300.  She describes eating very healthy and gives me quite detailed information although not a calorie count but says she has been very strict and she has been exercising at least 30 minutes every day.  She is disappointed she is not lost weight and she is not done better.  She did have a TSH 3 months ago that was elevated at 8.37 we changed her levothyroxine up to 137 mcg.  Her A1c was 11.0 try to add Ozempic but that was not covered and she cannot find an alternative so we added Glucotrol but that has not made significant impact  Review of Systems       Some general fatigue with occasional headaches, no tremors, no shortness of breath  Physical Exam   Vitals & Measurements    T: 98.7  F (Tympanic)  HR: 93 (Peripheral)  BP: 187/101     HT: 69.5 in  WT: 288.7 lb  BMI: 42.02        Alert quite animated       Lungs are breathing heart regular rate       2+ edema lower extremities no rashes  Assessment/Plan       1. Type 2 diabetes mellitus (E11.9)          Still averaging glucoses mostly in the 300s.  I recommended that she start insulin and written for start at 10 units per night her goal will be to get her blood sugars consistently around 150 needs to start.  Patient feels like something must be wrong because she is eating so well and she would like to see an endocrinologist.  I suggested she can also see a diabetic educator to look at her eating habits and so I put referrals into both         Ordered:          Basic Metabolic Panel* ([facility]), Specimen Type: Serum, Collection Date: 01/31/22 17:50:00 CST          Hemoglobin A1c* ([facility]), Specimen Type: Blood, Collection Date: 01/31/22 17:50:00 CST          Referral (Request), 01/31/22 18:02:00 CST, Referred to: Diabetic Care, Type 2 diabetes mellitus   Hypothyroid          Referral (Request), 01/31/22 18:02:00 CST, Referred to: Endocrinology, Reason for referral: see diagnosis, Type 2 diabetes mellitus  Hypothyroid                2. Hypothyroid (E03.9)          We will be checking a TSH she was only slightly elevated and I expect it to be normal although I do note her blood pressure is quite elevated today         Ordered:          Referral (Request), 01/31/22 18:02:00 CST, Referred to: Diabetic Care, Type 2 diabetes mellitus  Hypothyroid          Referral (Request), 01/31/22 18:02:00 CST, Referred to: Endocrinology, Reason for referral: see diagnosis, Type 2 diabetes mellitus  Hypothyroid          TSH* ([facility]), Specimen Type: Serum, Collection Date: 01/31/22 17:50:00 CST                3. Elevated blood pressure reading (R03.0)          Blood pressure is quite high which she says is because she had run back out to her car and really move quickly however notices not resolve after 10 to 15 minutes of sitting in the room with me.  She assures me has been normal when she is checked at other places.  She is agreed to check it again and write down the numbers I did do a TSH to see if she possibly could be in hyper replacement                Orders:         insulin glargine, ( 10 units ), Subcutaneous, daily, # 3 mL, 3 Refill(s), Type: Maintenance, Pharmacy: [facility], 10 units Subcutaneous daily, 69.5, in, 01/31/22 17:41:00 CST, Height Measured, 288.7, lb, 01/31/22 17:41:00 CST, Weight Measured, (Ordered)  Patient Information     Name:GABRIELE FINK      Address:      [address] [place] [id number]     Sex:Female     YOB: 1975     [phone]     Emergency Contact:JERMAINE FINK     MRN:[id number]     FIN:[id number]     Location:[facility]     Date of Service:01/31/2022      Primary Care Physician:       Dameon Ferguson MD, [phone]      Attending Physician:       Dameon Ferguson MD, [phone]  Problem  List/Past Medical History    Ongoing     Anemia, iron deficiency     Female Hirsutism     Hypothyroid     Menarche     Morbid Obesity     Obese     Refusal of statin medication by patient     Tobacco user       Comments: pt quit 2009     Type 2 diabetes mellitus    Historical     Concussion       Comments: significant fall with mild head concussion and fracture of L4  Procedure/Surgical History     Laparoscopic cholecystectomy (09/19/2013)      Comments: Robotic assisted.     History of shoulder surgery (2006)  Medications    amoxicillin 500 mg oral tablet, 2000 mg= 4 tab(s), Oral, once, 3 refills    zonia contour lancets, See Instructions, 3 refills    contour test strips, See Instructions, 3 refills    Fish Oil, Oral    Glucotrol XL 5 mg oral tablet, extended release, 5 mg= 1 tab(s), Oral, daily    Iron Chews, 15 mg, Oral, daily    levothyroxine 137 mcg (0.137 mg) oral tablet, 137 mcg= 1 tab(s), Oral, daily, 3 refills    metFORMIN 500 mg oral tablet, 500 mg= 1 tab(s), Oral, bid, 3 refills    multivitamin with fluoride, daily    Ozempic (1 mg dose) 4 mg/3 mL subcutaneous solution, 1 mg, Subcutaneous, qweek, 3 refills    Semglee (Prefilled Pen) 100 units/mL subcutaneous solution, 10 units, Subcutaneous, daily, 3 refills  Allergies    sulfa drugs (hives, SOB)  Social History    Smoking Status     Former smoker     Alcohol - Current      Current, Wine (5 oz), 1-2 times per week     Electronic Cigarette/Vaping      Electronic Cigarette Use: Never.     Employment/School      Employed, Work/School description: [employer].     Exercise - Regular exercise      Exercise frequency: 3-4 times/week. Exercise type: Walking.     Home/Environment      Marital status: . Spouse/Partner name: Harsha.     Sexual      Sexually active: Yes. Sexual orientation: Heterosexual.     Substance Abuse      Never     Tobacco - Past      Former smoker, quit more than 30 days ago  Family History    Cancer: Sister.     Diabetes mellitus type I: Father and Sister.    Hypertension: Mother.    Lymphoma: Brother.    Obesity, morbid.: Sister.    Thyroid Ca: Mother and Brother.    Sister: History is negative  Lab Results          Lab Results (Last 4 results within 90 days)           Sodium Level: 135 mmol/L [135 mmol/L - 146 mmol/L] (11/12/21 15:16:00)          Potassium Level: 4.1 mmol/L [3.5 mmol/L - 5.3 mmol/L] (11/12/21 15:16:00)          Chloride Level: 100 mmol/L [98 mmol/L - 110 mmol/L] (11/12/21 15:16:00)          CO2 Level: 24 mmol/L [20 mmol/L - 32 mmol/L] (11/12/21 15:16:00)          Glucose Level: 266 mg/dL High [65 mg/dL - 99 mg/dL] (11/12/21 15:16:00)          BUN: 14 mg/dL [7 mg/dL - 25 mg/dL] (11/12/21 15:16:00)          Creatinine Level: 0.82 mg/dL [0.5 mg/dL - 1.1 mg/dL] (11/12/21 15:16:00)          BUN/Creat Ratio: NOT APPLICABLE [6  - 22] (11/12/21 15:16:00)          eGFR: 86 mL/min/1.73m2 (11/12/21 15:16:00)          eGFR African American: 100 mL/min/1.73m2 (11/12/21 15:16:00)          Calcium Level: 9.5 mg/dL [8.6 mg/dL - 10.2 mg/dL] (11/12/21 15:16:00)          Hgb A1c: 11 High (11/12/21 15:16:00)          TSH: 8.37 mIU/L High (11/12/21 15:16:00)          U Creatinine: See comment (11/12/21 15:16:00)          U Microalbumin: See comment (11/12/21 15:16:00)  Immunizations          Other Immunizations          Administration Dosage Date(s)          tetanus/diphth/pertuss (Tdap) adult/adol          11/21/2007, 12/14/2017

## 2022-08-05 DIAGNOSIS — E03.9 HYPOTHYROIDISM: Primary | ICD-10-CM

## 2022-08-08 RX ORDER — LEVOTHYROXINE SODIUM 150 UG/1
TABLET ORAL
Qty: 30 TABLET | Refills: 0 | Status: SHIPPED | OUTPATIENT
Start: 2022-08-08

## 2022-08-08 NOTE — TELEPHONE ENCOUNTER
TC- please contact patient. 30 day supply of medication sent to pharmacy. Patient is due for follow up visit and labs.    Last Written Prescription Date: not active on med list  Last office visit: :  1/31/22 (dose increase).  Future Office Visit:  Patient was to follow up in 3 months with repeat labs and visit.              TSH   Date Value Ref Range Status   01/31/2022 9.83 (H) mIU/L Final     Comment:               Reference Range                         > or = 20 Years  0.40-4.50                              Pregnancy Ranges            First trimester    0.26-2.66            Second trimester   0.55-2.73            Third trimester    0.43-2.91  Lab test performed by:  Lab Mnemonic:[email or web address]  [facility]  [address] [place] [address]  Patrick Gutierrez M.D.  [facility] Specimen received date and time: 02-FEB-2022 03:30:00.00

## 2022-12-26 NOTE — NURSING NOTE
Comprehensive Intake Entered On:  7/5/2019 3:07 PM CDT    Performed On:  7/5/2019 3:05 PM CDT by Lisa Hampton CMA               Summary   Chief Complaint :   removed a deer tick from right groin area last night   Advance Directive :   No   Menstrual Status :   Menarcheal   Weight Measured :   281.6 lb(Converted to: 281 lb 10 oz, 127.73 kg)    Systolic Blood Pressure :   142 mmHg (HI)    Diastolic Blood Pressure :   78 mmHg   Mean Arterial Pressure :   99 mmHg   Peripheral Pulse Rate :   64 bpm   Temperature Tympanic :   98.7 DegF(Converted to: 37.1 DegC)    Lisa Hampton CMA - 7/5/2019 3:05 PM CDT   Health Status   Allergies Verified? :   Yes   Medication History Verified? :   Yes   Immunizations Current :   Yes   Medical History Verified? :   Yes   Pre-Visit Planning Status :   Completed   Lisa Hampton CMA - 7/5/2019 3:05 PM CDT   Social History   Social History   (As Of: 7/5/2019 3:07:59 PM CDT)   Alcohol:  Current      Current, Wine (5 oz), 1-2 times per week   (Last Updated: 5/10/2011 2:04:57 PM CDT by Judy Sanz)          Tobacco:  Past      Current   Comments:  5/10/2011 2:04 PM - Judy Sanz: quit   (Last Updated: 5/10/2011 2:04:18 PM CDT by Judy Sanz)   Former smoker, quit more than 30 days ago   (Last Updated: 7/5/2019 3:06:08 PM CDT by Lisa Hampton CMA)          Substance Abuse:        Never   (Last Updated: 12/21/2011 4:42:18 PM CST by April Elena CMA)          Employment/School:        Employed, Work/School description: [employer].   (Last Updated: 12/21/2011 4:42:18 PM CST by April Elena CMA)          Home/Environment:        Marital status: .  Spouse/Partner name: Harsha.   (Last Updated: 12/21/2011 4:42:18 PM CST by Aprli Elena CMA)          Exercise:  Regular exercise      Exercise type: Walking.   (Last Updated: 5/10/2011 2:05:16 PM CDT by Judy Sanz)   Exercise frequency: 3-4 times/week.  Exercise type: Walking.   (Last Updated: 12/21/2011  4:42:18 PM CST by April Elena CMA)          Sexual:        Sexually active: Yes.  Sexual orientation: Heterosexual.   (Last Updated: 12/21/2011 4:42:18 PM CST by April Elena CMA)   declines

## 2024-09-23 NOTE — TELEPHONE ENCOUNTER
---------------------  From: Raya Sebastian LPN (Phone Messages Pool ([id number]))   To: [facility] [place] ([id number]);     Sent: 6/24/2019 9:15:40 AM CDT  Subject: FW: Vericous vein     Please advise.       ---------------------  From: GABRIELE FINK  To: [facility]  Sent: 06/24/2019 08:03 a.m. CDT  Subject: Vericous vein  I have a vericous vein that runs over top of my right knee.   Below the knee it bruised over top of that vein.  It has been very painful all weekend.   Is this something I should be concerned about.---------------------  From: Jean-Paul Serrano PA-C (Advanced Practice Provider Pool ([id number]))   To: Phone Messages Pool ([facility]);     Sent: 6/24/2019 9:25:09 AM CDT  Subject: RE: Vericous vein     This sounds like an acute injury to the vein.  This should improve as the week progresses but if it is getting worse or   not improving then it should be evaluated in clinic---------------------  From: Raya Sebastian LPN (Phone Messages Pool ([id number]))   To: GABRIELE FINK    Sent: 6/24/2019 9:28:09 AM CDT  Subject: FW: Vericous vein   T(C): 36.7 (09-23-24 @ 21:30), Max: 36.7 (09-23-24 @ 18:14)  HR: 58 (09-23-24 @ 21:30) (50 - 65)  BP: 120/74 (09-23-24 @ 21:30) (112/63 - 136/74)  RR: 18 (09-23-24 @ 21:30) (11 - 21)  SpO2: 98% (09-23-24 @ 21:30) (96% - 100%)    CONSTITUTIONAL: Well groomed, no apparent distress  EYES: PERRLA and symmetric, EOMI, No conjunctival or scleral injection, non-icteric  ENMT: Oral mucosa with moist membranes             NECK: C-spine nontender, R trapezius mildly TTP  RESP: No respiratory distress, no use of accessory muscles  CV: RRR  GI: Soft, NT, ND, no rebound, no guarding; no palpable masses; no hepatosplenomegaly; no hernia palpated  MSK: Normal ROM without pain, no spinal tenderness, normal muscle strength/tone  SKIN: Superficial linear abrasion over the forehead, no active bleeding, well approximated  NEURO: L sided visual field deficit, pts daughter reports this is consistent with exams since the Pt's CVA. CN II-XII intact; sensation intact in upper and lower extremities b/l to light touch   PSYCH: A+O x 1 but aware of situation, mood and affect appropriate